# Patient Record
Sex: MALE | Race: WHITE | NOT HISPANIC OR LATINO | Employment: OTHER | URBAN - METROPOLITAN AREA
[De-identification: names, ages, dates, MRNs, and addresses within clinical notes are randomized per-mention and may not be internally consistent; named-entity substitution may affect disease eponyms.]

---

## 2019-07-12 ENCOUNTER — TRANSFERRED RECORDS (OUTPATIENT)
Dept: HEALTH INFORMATION MANAGEMENT | Facility: CLINIC | Age: 59
End: 2019-07-12

## 2021-02-24 ENCOUNTER — TRANSFERRED RECORDS (OUTPATIENT)
Dept: HEALTH INFORMATION MANAGEMENT | Facility: CLINIC | Age: 61
End: 2021-02-24

## 2023-06-01 ENCOUNTER — HEALTH MAINTENANCE LETTER (OUTPATIENT)
Age: 63
End: 2023-06-01

## 2023-06-12 ENCOUNTER — OFFICE VISIT (OUTPATIENT)
Dept: FAMILY MEDICINE | Facility: CLINIC | Age: 63
End: 2023-06-12
Payer: COMMERCIAL

## 2023-06-12 VITALS
HEART RATE: 66 BPM | DIASTOLIC BLOOD PRESSURE: 78 MMHG | TEMPERATURE: 97.7 F | BODY MASS INDEX: 30.94 KG/M2 | SYSTOLIC BLOOD PRESSURE: 118 MMHG | WEIGHT: 221 LBS | RESPIRATION RATE: 14 BRPM | HEIGHT: 71 IN | OXYGEN SATURATION: 99 %

## 2023-06-12 DIAGNOSIS — H81.10 BENIGN PAROXYSMAL POSITIONAL VERTIGO, UNSPECIFIED LATERALITY: Primary | ICD-10-CM

## 2023-06-12 PROCEDURE — 99203 OFFICE O/P NEW LOW 30 MIN: CPT | Performed by: FAMILY MEDICINE

## 2023-06-12 ASSESSMENT — PAIN SCALES - GENERAL: PAINLEVEL: NO PAIN (0)

## 2023-06-12 NOTE — PROGRESS NOTES
"  Assessment & Plan     Benign paroxysmal positional vertigo, unspecified laterality  Improved but mild residual vertigo noted.  Without other localizing symptoms noted.  Physical therapy referral is given.  Does have a short-term follow-up with the physical scheduled for 6/29/2023.  If persistent symptoms noted at that time could consider imaging evaluation.  Can use Dramamine type medication as needed if mild symptoms occur.  - Physical Therapy Referral; Future          Review records from [facility] everywhere    BMI:   Estimated body mass index is 30.6 kg/m  as calculated from the following:    Height as of this encounter: 1.81 m (5' 11.26\").    Weight as of this encounter: 100.2 kg (221 lb).   Weight management plan: Discussed healthy diet and exercise guidelines    Patient Instructions   Referral to PHYSICAL THERAPY for the vertigo symptoms.      Be sure to remain well hydrated.    Return to clinic for worsening headache or double vision, vomiting or other new symptoms.         Anabela Arriola MD  [facility] MIGUEL Del Valle is a 63 year old, presenting for the following health issues:  Dizziness        6/12/2023     8:27 AM   Additional Questions   Roomed by Crystal   Accompanied by self         6/12/2023     8:27 AM   Patient Reported Additional Medications   Patient reports taking the following new medications none     History of Present Illness       Reason for visit:  Lingering symptoms after Vertigo  Symptom onset:  More than a month    She eats 0-1 servings of fruits and vegetables daily.She consumes 1 sweetened beverage(s) daily.She exercises with enough effort to increase her heart rate 10 to 19 minutes per day.  She exercises with enough effort to increase her heart rate 3 or less days per week.   She is taking medications regularly.       Dizziness  Onset/Duration: 6 weeks ago  Description: Dizziness lightheaded mild headache  Do you feel faint: No  Does it feel like " "the surroundings (bed, room) are moving: YES- happened first couple of days  Unsteady/off balance: YES - not like going to fall but uncertain position in space.    Have you passed out or fallen: No  Intensity: mild, moderate  Progression of Symptoms: same  Accompanying Signs & Symptoms:  Heart palpitations or chest pain: No  Nausea, vomiting: No  Weakness or lack of coordination in arms or legs: No  Vision or speech changes: No  Numbness or tingling: No  Ringing in ears (Tinnitus): No  Hearing Loss: No  History:   Head trauma/concussion history: No  Previous similar symptoms: No  Recent bleeding history: No  Any new medications (BP?): No  Precipitating factors:   Worse with activity: YES when looking up  Worse with head movement: YES  Alleviating factors:   Does staying in a fixed position give relief: YES  Therapies tried and outcome: None        Sudden onset 6 week ago.  Laying on right side and flung over quick right to left and hit alarm - dizziness -vertigo.  No hearing deficit.  Felt like feet were having difficulty telling where he was  Spent a couple of days in bed.  Used dramamine   After 3 days symptoms were less and could go back to work.    headache initially - mild headache now more than normally.  No nausea.    Looking up and bending down worse symptoms noted, no symptoms with side to side moving head.     Hearing normal.    Review of Systems   Constitutional, HEENT, cardiovascular, pulmonary, gi and gu systems are negative, except as otherwise noted.      Objective    /78 (BP Location: Right arm, Patient Position: Sitting, Cuff Size: Adult Large)   Pulse 66   Temp 97.7  F (36.5  C) (Oral)   Resp 14   Ht 1.81 m (5' 11.26\")   Wt 100.2 kg (221 lb)   SpO2 99%   BMI 30.60 kg/m    Body mass index is 30.6 kg/m .  Physical Exam   GENERAL: alert, no distress and over weight  EYES: Eyes grossly normal to inspection, PERRL and conjunctivae and sclerae normal  HENT: ear canals and TM's normal, nose " and mouth without ulcers or lesions  NECK: no adenopathy, no asymmetry, masses, or scars and thyroid normal to palpation  RESP: lungs clear to auscultation - no rales, rhonchi or wheezes  CV: regular rate and rhythm, normal S1 S2, no S3 or S4, no murmur, click or rub, no peripheral edema and peripheral pulses strong  ABDOMEN: soft, nontender, no hepatosplenomegaly, no masses and bowel sounds normal  MS: no gross musculoskeletal defects noted, no edema  NEURO: Normal strength and tone, sensory exam grossly normal, mentation intact, speech normal, cranial nerves 2-12 intact without nystagmus, DTR's normal and symmetric throughout, gait abnormal: First step with slight antalgia to the left but able to complete the heel-to-toe following out without difficulty and Romberg able to maintain an upright position but felt as though he was falling backwards              This chart was documented by provider using a voice activated software called Dragon in addition to manual typing. There may be vocabulary errors or other grammatical errors due to this.

## 2023-06-12 NOTE — PATIENT INSTRUCTIONS
Referral to PHYSICAL THERAPY for the vertigo symptoms.      Be sure to remain well hydrated.    Return to clinic for worsening headache or double vision, vomiting or other new symptoms.

## 2023-06-29 ENCOUNTER — OFFICE VISIT (OUTPATIENT)
Dept: FAMILY MEDICINE | Facility: CLINIC | Age: 63
End: 2023-06-29
Payer: COMMERCIAL

## 2023-06-29 ENCOUNTER — TELEPHONE (OUTPATIENT)
Dept: FAMILY MEDICINE | Facility: CLINIC | Age: 63
End: 2023-06-29

## 2023-06-29 VITALS
OXYGEN SATURATION: 96 % | HEART RATE: 66 BPM | TEMPERATURE: 98.1 F | DIASTOLIC BLOOD PRESSURE: 81 MMHG | RESPIRATION RATE: 16 BRPM | BODY MASS INDEX: 30.63 KG/M2 | WEIGHT: 218.8 LBS | HEIGHT: 71 IN | SYSTOLIC BLOOD PRESSURE: 117 MMHG

## 2023-06-29 DIAGNOSIS — Z11.4 SCREENING FOR HIV (HUMAN IMMUNODEFICIENCY VIRUS): ICD-10-CM

## 2023-06-29 DIAGNOSIS — Z12.5 SCREENING FOR PROSTATE CANCER: ICD-10-CM

## 2023-06-29 DIAGNOSIS — Z00.00 ROUTINE GENERAL MEDICAL EXAMINATION AT A HEALTH CARE FACILITY: Primary | ICD-10-CM

## 2023-06-29 DIAGNOSIS — Z11.59 NEED FOR HEPATITIS C SCREENING TEST: ICD-10-CM

## 2023-06-29 DIAGNOSIS — Z13.1 SCREENING FOR DIABETES MELLITUS: ICD-10-CM

## 2023-06-29 DIAGNOSIS — Z13.220 LIPID SCREENING: ICD-10-CM

## 2023-06-29 DIAGNOSIS — R42 VERTIGO: ICD-10-CM

## 2023-06-29 LAB
ALBUMIN SERPL BCG-MCNC: 4.4 G/DL (ref 3.5–5.2)
ALP SERPL-CCNC: 56 U/L (ref 40–129)
ALT SERPL W P-5'-P-CCNC: 30 U/L (ref 0–70)
ANION GAP SERPL CALCULATED.3IONS-SCNC: 15 MMOL/L (ref 7–15)
AST SERPL W P-5'-P-CCNC: 27 U/L (ref 0–45)
BILIRUB SERPL-MCNC: 0.8 MG/DL
BUN SERPL-MCNC: 16.7 MG/DL (ref 8–23)
CALCIUM SERPL-MCNC: 9.5 MG/DL (ref 8.8–10.2)
CHLORIDE SERPL-SCNC: 102 MMOL/L (ref 98–107)
CHOLEST SERPL-MCNC: 184 MG/DL
CREAT SERPL-MCNC: 0.88 MG/DL (ref 0.67–1.17)
DEPRECATED HCO3 PLAS-SCNC: 24 MMOL/L (ref 22–29)
ERYTHROCYTE [DISTWIDTH] IN BLOOD BY AUTOMATED COUNT: 13.5 % (ref 10–15)
GFR SERPL CREATININE-BSD FRML MDRD: >90 ML/MIN/1.73M2
GLUCOSE SERPL-MCNC: 94 MG/DL (ref 70–99)
HCT VFR BLD AUTO: 45.3 % (ref 40–53)
HDLC SERPL-MCNC: 40 MG/DL
HGB BLD-MCNC: 15.4 G/DL (ref 13.3–17.7)
LDLC SERPL CALC-MCNC: 114 MG/DL
MCH RBC QN AUTO: 29.6 PG (ref 26.5–33)
MCHC RBC AUTO-ENTMCNC: 34 G/DL (ref 31.5–36.5)
MCV RBC AUTO: 87 FL (ref 78–100)
NONHDLC SERPL-MCNC: 144 MG/DL
PLATELET # BLD AUTO: 214 10E3/UL (ref 150–450)
POTASSIUM SERPL-SCNC: 4.4 MMOL/L (ref 3.4–5.3)
PROT SERPL-MCNC: 7.6 G/DL (ref 6.4–8.3)
PSA SERPL DL<=0.01 NG/ML-MCNC: 0.88 NG/ML (ref 0–4.5)
RBC # BLD AUTO: 5.2 10E6/UL (ref 4.4–5.9)
SODIUM SERPL-SCNC: 141 MMOL/L (ref 136–145)
TRIGL SERPL-MCNC: 149 MG/DL
TSH SERPL DL<=0.005 MIU/L-ACNC: 0.98 UIU/ML (ref 0.3–4.2)
WBC # BLD AUTO: 4.5 10E3/UL (ref 4–11)

## 2023-06-29 PROCEDURE — 85027 COMPLETE CBC AUTOMATED: CPT | Performed by: FAMILY MEDICINE

## 2023-06-29 PROCEDURE — 87389 HIV-1 AG W/HIV-1&-2 AB AG IA: CPT | Performed by: FAMILY MEDICINE

## 2023-06-29 PROCEDURE — 80053 COMPREHEN METABOLIC PANEL: CPT | Performed by: FAMILY MEDICINE

## 2023-06-29 PROCEDURE — 99396 PREV VISIT EST AGE 40-64: CPT | Performed by: FAMILY MEDICINE

## 2023-06-29 PROCEDURE — 80061 LIPID PANEL: CPT | Performed by: FAMILY MEDICINE

## 2023-06-29 PROCEDURE — 84443 ASSAY THYROID STIM HORMONE: CPT | Performed by: FAMILY MEDICINE

## 2023-06-29 PROCEDURE — 36415 COLL VENOUS BLD VENIPUNCTURE: CPT | Performed by: FAMILY MEDICINE

## 2023-06-29 PROCEDURE — 86803 HEPATITIS C AB TEST: CPT | Performed by: FAMILY MEDICINE

## 2023-06-29 PROCEDURE — G0103 PSA SCREENING: HCPCS | Performed by: FAMILY MEDICINE

## 2023-06-29 ASSESSMENT — ENCOUNTER SYMPTOMS
COUGH: 0
EYE PAIN: 0
HEADACHES: 1
ARTHRALGIAS: 1
HEMATURIA: 0
PARESTHESIAS: 0
PALPITATIONS: 0
NERVOUS/ANXIOUS: 0
CONSTIPATION: 0
JOINT SWELLING: 0
SHORTNESS OF BREATH: 0
SORE THROAT: 0
HEMATOCHEZIA: 0
ABDOMINAL PAIN: 0
FEVER: 0
HEARTBURN: 0
DIZZINESS: 1
MYALGIAS: 1
FREQUENCY: 1
BREAST MASS: 0
WEAKNESS: 0
DIARRHEA: 0
DYSURIA: 0
NAUSEA: 0
CHILLS: 0

## 2023-06-29 ASSESSMENT — PAIN SCALES - GENERAL: PAINLEVEL: NO PAIN (0)

## 2023-06-29 NOTE — PROGRESS NOTES
SUBJECTIVE:   CC: Eligio is an 63 year old who presents for preventative health visit.       6/29/2023     8:34 AM   Additional Questions   Roomed by Tata BROOKS   Accompanied by self     Healthy Habits:     Getting at least 3 servings of Calcium per day:  Yes    Bi-annual eye exam:  Yes    Dental care twice a year:  Yes    Sleep apnea or symptoms of sleep apnea:  None    Diet:  Regular (no restrictions)    Frequency of exercise:  1 day/week    Duration of exercise:  Less than 15 minutes    Taking medications regularly:  Yes    Medication side effects:  None    PHQ-2 Total Score: 0    Additional concerns today:  No    Biking, walking, weight lifting  -  No limitations.      3 DDD lumbar - careful with compression activities.      Vertigo:   Persistent vertigo symptoms.    Headache - since onset of vertigo - fuzzy feeling.  Not severe. Rolling in bed worse symptoms.  AM worse.      Today's PHQ-2 Score:       6/29/2023     8:31 AM   PHQ-2 ( 1999 [employer])   Q1: Little interest or pleasure in doing things 0   Q2: Feeling down, depressed or hopeless 0   PHQ-2 Score 0   Q1: Little interest or pleasure in doing things Not at all   Q2: Feeling down, depressed or hopeless Not at all   PHQ-2 Score 0           Social History     Tobacco Use     Smoking status: Never     Smokeless tobacco: Never   Substance Use Topics     Alcohol use: Not on file             6/29/2023     8:31 AM   Alcohol Use   Prescreen: >3 drinks/day or >7 drinks/week? No       Last PSA: No results found for: PSA    Reviewed orders with patient. Reviewed health maintenance and updated orders accordingly - Yes  BP Readings from Last 3 Encounters:   06/29/23 117/81   06/12/23 118/78    Wt Readings from Last 3 Encounters:   06/29/23 99.2 kg (218 lb 12.8 oz)   06/12/23 100.2 kg (221 lb)                    Reviewed and updated as needed this visit by clinical staff   Tobacco  Allergies  Meds   Med Hx  Surg Hx  Fam Hx          Reviewed and updated as needed  "this visit by Provider   Tobacco  Allergies  Meds   Med Hx  Surg Hx  Fam Hx         Past Medical History:   Diagnosis Date     BCC (basal cell carcinoma), face     nose, MOHS, other locations      Past Surgical History:   Procedure Laterality Date     ARTHROSCOPY KNEE Right     x 2     left rotator cuff surgery       SHOULDER SURGERY Right     bone spur       Review of Systems   Constitutional: Negative for chills and fever.   HENT: Negative for congestion, ear pain, hearing loss and sore throat.    Eyes: Negative for pain and visual disturbance.   Respiratory: Negative for cough and shortness of breath.    Cardiovascular: Negative for chest pain, palpitations and peripheral edema.   Gastrointestinal: Negative for abdominal pain, constipation, diarrhea, heartburn, hematochezia and nausea.   Breasts:  Negative for tenderness, breast mass and discharge.   Genitourinary: Positive for frequency. Negative for dysuria, genital sores, hematuria, pelvic pain, urgency, vaginal bleeding and vaginal discharge.   Musculoskeletal: Positive for arthralgias and myalgias. Negative for joint swelling.   Skin: Negative for rash.   Neurological: Positive for dizziness and headaches. Negative for weakness and paresthesias.   Psychiatric/Behavioral: Negative for mood changes. The patient is not nervous/anxious.          OBJECTIVE:   /81 (BP Location: Right arm, Patient Position: Sitting, Cuff Size: Adult Large)   Pulse 66   Temp 98.1  F (36.7  C) (Oral)   Resp 16   Ht 1.803 m (5' 11\")   Wt 99.2 kg (218 lb 12.8 oz)   SpO2 96%   BMI 30.52 kg/m      Physical Exam  GENERAL: alert, no distress and obese  EYES: Eyes grossly normal to inspection, PERRL and conjunctivae and sclerae normal  HENT: ear canals and TM's normal, nose and mouth without ulcers or lesions  NECK: no adenopathy, no asymmetry, masses, or scars and thyroid normal to palpation  RESP: lungs clear to auscultation - no rales, rhonchi or wheezes  CV: regular " rate and rhythm, normal S1 S2, no S3 or S4, no murmur, click or rub, no peripheral edema and peripheral pulses strong  ABDOMEN: soft, nontender, no hepatosplenomegaly, no masses and bowel sounds normal  MS: no gross musculoskeletal defects noted, no edema  SKIN: no suspicious lesions or rashes  NEURO: Normal strength and tone, mentation intact and speech normal.  CN2-12 intact.    PSYCH: mentation appears normal, affect normal/bright    Diagnostic Test Results:  Labs reviewed in Epic  Results for orders placed or performed in visit on 06/29/23   Lipid panel reflex to direct LDL Non-fasting     Status: Abnormal   Result Value Ref Range    Cholesterol 184 <200 mg/dL    Triglycerides 149 <150 mg/dL    Direct Measure HDL 40 >=40 mg/dL    LDL Cholesterol Calculated 114 (H) <=100 mg/dL    Non HDL Cholesterol 144 (H) <130 mg/dL    Narrative    Cholesterol  Desirable:  <200 mg/dL    Triglycerides  Normal:  Less than 150 mg/dL  Borderline High:  150-199 mg/dL  High:  200-499 mg/dL  Very High:  Greater than or equal to 500 mg/dL    Direct Measure HDL  Female:  Greater than or equal to 50 mg/dL   Male:  Greater than or equal to 40 mg/dL    LDL Cholesterol  Desirable:  <100mg/dL  Above Desirable:  100-129 mg/dL   Borderline High:  130-159 mg/dL   High:  160-189 mg/dL   Very High:  >= 190 mg/dL    Non HDL Cholesterol  Desirable:  130 mg/dL  Above Desirable:  130-159 mg/dL  Borderline High:  160-189 mg/dL  High:  190-219 mg/dL  Very High:  Greater than or equal to 220 mg/dL   PSA, screen     Status: Normal   Result Value Ref Range    Prostate Specific Antigen Screen 0.88 0.00 - 4.50 ng/mL    Narrative    This result is obtained using the Roche Elecsys total PSA method on the reyes e801 immunoassay analyzer. Results obtained with different assay methods or kits cannot be used interchangeably.   Comprehensive metabolic panel (BMP + Alb, Alk Phos, ALT, AST, Total. Bili, TP)     Status: Normal   Result Value Ref Range    Sodium 141  136 - 145 mmol/L    Potassium 4.4 3.4 - 5.3 mmol/L    Chloride 102 98 - 107 mmol/L    Carbon Dioxide (CO2) 24 22 - 29 mmol/L    Anion Gap 15 7 - 15 mmol/L    Urea Nitrogen 16.7 8.0 - 23.0 mg/dL    Creatinine 0.88 0.67 - 1.17 mg/dL    Calcium 9.5 8.8 - 10.2 mg/dL    Glucose 94 70 - 99 mg/dL    Alkaline Phosphatase 56 40 - 129 U/L    AST 27 0 - 45 U/L    ALT 30 0 - 70 U/L    Protein Total 7.6 6.4 - 8.3 g/dL    Albumin 4.4 3.5 - 5.2 g/dL    Bilirubin Total 0.8 <=1.2 mg/dL    GFR Estimate >90 >60 mL/min/1.73m2   CBC with platelets     Status: Normal   Result Value Ref Range    WBC Count 4.5 4.0 - 11.0 10e3/uL    RBC Count 5.20 4.40 - 5.90 10e6/uL    Hemoglobin 15.4 13.3 - 17.7 g/dL    Hematocrit 45.3 40.0 - 53.0 %    MCV 87 78 - 100 fL    MCH 29.6 26.5 - 33.0 pg    MCHC 34.0 31.5 - 36.5 g/dL    RDW 13.5 10.0 - 15.0 %    Platelet Count 214 150 - 450 10e3/uL   TSH with free T4 reflex     Status: Normal   Result Value Ref Range    TSH 0.98 0.30 - 4.20 uIU/mL       ASSESSMENT/PLAN:   (Z00.00) Routine general medical examination at a health care facility  (primary encounter diagnosis)  Comment: Nonfasting  Plan: Screening and preventive care discussed.  Reports he had colon cancer screening through [facility] physicians but he is uncertain of the date on that.  FABY is signed today to obtain those records    (R42) Vertigo  Comment: Persistent vertigo and nonspecific headache that have been present now for over 2 months.  Plan: Comprehensive metabolic panel (BMP + Alb, Alk         Phos, ALT, AST, Total. Bili, TP), CBC with         platelets, TSH with free T4 reflex, MR Brain         w/o & w Contrast        Information given regarding scheduling with the physical therapist for exercises for the vertigo.  Additional lab testing performed today is normal.  Order is given for an MRI for additional evaluation due to the prolonged nature of his symptoms.    (Z12.5) Screening for prostate cancer  Comment:   Plan: PSA, screen         Normal PSA    (Z13.220) Lipid screening  Comment: The 10-year ASCVD risk score (Javid RIDLEY, et al., 2019) is: 10.9%    Values used to calculate the score:      Age: 63 years      Sex: Male      Is Non- : No      Diabetic: No      Tobacco smoker: No      Systolic Blood Pressure: 117 mmHg      Is BP treated: No      HDL Cholesterol: 40 mg/dL      Total Cholesterol: 184 mg/dL  Plan: Lipid panel reflex to direct LDL Non-fasting        Due to elevated 10-year estimated risk, I have recommended cholesterol-lowering therapy.  Await patient response.  Could also consider cardiac imaging for coronary calcium score.    (Z13.1) Screening for diabetes mellitus  Comment:   Plan: Comprehensive metabolic panel (BMP + Alb, Alk         Phos, ALT, AST, Total. Bili, TP)        Normal blood sugar    (Z11.4) Screening for HIV (human immunodeficiency virus)  Comment: Low risk  Plan: HIV Antigen Antibody Combo        Screening    (Z11.59) Need for hepatitis C screening test  Comment: Low risk  Plan: Hepatitis C Screen Reflex to HCV RNA Quant and         Genotype        Screening      Patient has been advised of split billing requirements and indicates understanding: Yes      COUNSELING:   Reviewed preventive health counseling, as reflected in patient instructions       Regular exercise       Healthy diet/nutrition       Vision screening       Consider Hep C screening for all patients one time for ages 18-79 years       HIV screeninx in teen years, 1x in adult years, and at intervals if high risk       Colorectal cancer screening       Prostate cancer screening       Osteoporosis prevention/bone health        She reports that she has never smoked. She has never used smokeless tobacco.        Anabela Arriola MD  [facility]        Patient Instructions     Labs today.  Release of information for records from [facility].    MRI recommended as discussed due to persistent vertigo  symptoms.   You can call [phone] to schedule this at the [facility] building in [place]       Referral Details    Referred By  Referred To   Anabela Arriola MD   [address]   [facility] [address]   Phone: [phone]   Fax: [phone]    Diagnoses: Benign paroxysmal positional vertigo, unspecified laterality   Order: Physical Therapy Referral       Comment: Please be aware that coverage of these services is subject to the terms and limitations of your health insurance plan.  Call member services at your health plan with any benefit or coverage questions.   If you have not heard from the scheduling office within 2 business days, please call [phone] for [employer], [phone] for [employer] and [phone] for [employer].               This chart was documented by provider using a voice activated software called Dragon in addition to manual typing. There may be vocabulary errors or other grammatical errors due to this.

## 2023-06-29 NOTE — PATIENT INSTRUCTIONS
Labs today.  Release of information for records from [facility].    MRI recommended as discussed due to persistent vertigo symptoms.   You can call [phone] to schedule this at the [facility] building in [place]       Referral Details    Referred By  Referred To   Anabela Arriola MD   [address]   [facility] [address]   Phone: [phone]   Fax: [phone]    Diagnoses: Benign paroxysmal positional vertigo, unspecified laterality   Order: Physical Therapy Referral       Comment: Please be aware that coverage of these services is subject to the terms and limitations of your health insurance plan.  Call member services at your health plan with any benefit or coverage questions.   If you have not heard from the scheduling office within 2 business days, please call [phone] for [employer], [phone] for [employer] and [phone] for [employer].       Preventive Health Recommendations  Male Ages 50 - 64    Yearly exam:             See your health care provider every year in order to  o   Review health changes.   o   Discuss preventive care.    o   Review your medicines if your doctor has prescribed any.   Have a cholesterol test every 5 years, or more frequently if you are at risk for high cholesterol/heart disease.   Have a diabetes test (fasting glucose) every three years. If you are at risk for diabetes, you should have this test more often.   Have a colonoscopy at age 50, or have a yearly FIT test (stool test). These exams will check for colon cancer.    Talk with your health care provider about whether or not a prostate cancer screening test (PSA) is right for you.  You should be tested each year for STDs (sexually transmitted diseases), if you re at risk.     Shots: Get a flu shot each year. Get a tetanus shot every 10 years.     Nutrition:  Eat at least 5 servings of fruits and vegetables daily.   Eat whole-grain bread, whole-wheat pasta and brown rice instead of white grains  and rice.   Get adequate Calcium and Vitamin D.     Lifestyle  Exercise for at least 150 minutes a week (30 minutes a day, 5 days a week). This will help you control your weight and prevent disease.   Limit alcohol to one drink per day.   No smoking.   Wear sunscreen to prevent skin cancer.   See your dentist every six months for an exam and cleaning.   See your eye doctor every 1 to 2 years.

## 2023-06-30 ENCOUNTER — MYC MEDICAL ADVICE (OUTPATIENT)
Dept: FAMILY MEDICINE | Facility: CLINIC | Age: 63
End: 2023-06-30
Payer: COMMERCIAL

## 2023-06-30 DIAGNOSIS — E78.00 PURE HYPERCHOLESTEROLEMIA: Primary | ICD-10-CM

## 2023-06-30 LAB
HCV AB SERPL QL IA: NONREACTIVE
HIV 1+2 AB+HIV1 P24 AG SERPL QL IA: NONREACTIVE

## 2023-06-30 NOTE — RESULT ENCOUNTER NOTE
The 10-year ASCVD risk score (Javid RIDLEY, et al., 2019) is: 10.9%    Values used to calculate the score:      Age: 63 years      Sex: Male      Is Non- : No      Diabetic: No      Tobacco smoker: No      Systolic Blood Pressure: 117 mmHg      Is BP treated: No      HDL Cholesterol: 40 mg/dL      Total Cholesterol: 184 mg/dL   Your LDL cholesterol is elevated.  When considering these results along with other risk factors, your overall risk for heart disease is greater than 10% in the next 10 years.  When this risk is greater than 7.5% on our risk assessment analysis, the recommendation is to begin cholesterol-lowering medication to decrease risk for heart disease in the future.  Let me know your thoughts about taking a cholesterol-lowering medication.  PSA testing is negative for prostate cancer  Your blood sugar, kidney function, and liver testing are all normal.  Thyroid testing is normal.  Blood cell counts are normal.

## 2023-07-02 NOTE — RESULT ENCOUNTER NOTE
HIV and hepatitis C screening are both negative  Please call or MyChart message me if you have any questions.      PSK

## 2023-07-05 ENCOUNTER — ANCILLARY PROCEDURE (OUTPATIENT)
Dept: MRI IMAGING | Facility: CLINIC | Age: 63
End: 2023-07-05
Attending: FAMILY MEDICINE
Payer: COMMERCIAL

## 2023-07-05 DIAGNOSIS — R42 VERTIGO: ICD-10-CM

## 2023-07-05 PROCEDURE — 70553 MRI BRAIN STEM W/O & W/DYE: CPT | Mod: TC | Performed by: RADIOLOGY

## 2023-07-05 PROCEDURE — A9585 GADOBUTROL INJECTION: HCPCS | Mod: JW | Performed by: RADIOLOGY

## 2023-07-05 RX ORDER — GADOBUTROL 604.72 MG/ML
9.5 INJECTION INTRAVENOUS ONCE
Status: COMPLETED | OUTPATIENT
Start: 2023-07-05 | End: 2023-07-05

## 2023-07-05 RX ORDER — ATORVASTATIN CALCIUM 20 MG/1
20 TABLET, FILM COATED ORAL DAILY
Qty: 90 TABLET | Refills: 3 | Status: SHIPPED | OUTPATIENT
Start: 2023-07-05 | End: 2024-08-12

## 2023-07-05 RX ADMIN — GADOBUTROL 9.5 ML: 604.72 INJECTION INTRAVENOUS at 07:46

## 2024-07-01 ENCOUNTER — TELEPHONE (OUTPATIENT)
Dept: FAMILY MEDICINE | Facility: CLINIC | Age: 64
End: 2024-07-01
Payer: COMMERCIAL

## 2024-07-01 NOTE — TELEPHONE ENCOUNTER
Patient Quality Outreach    Patient is due for the following:   Physical Annual Wellness Visit      Topic Date Due    COVID-19 Vaccine (4 - 2023-24 season) 09/01/2023    Pneumococcal Vaccine (1 of 1 - PCV) 05/21/2024       Next Steps:   Schedule annual wellness visit     Type of outreach:    Sent [employer] message.    Next Steps:  Reach out within 90 days via MyChart and Letter.    Max number of attempts reached: No. Will try again in 90 days if patient still on fail list.    Questions for provider review:    None           Tata Cancino MA

## 2024-08-10 ENCOUNTER — HEALTH MAINTENANCE LETTER (OUTPATIENT)
Age: 64
End: 2024-08-10

## 2024-08-10 SDOH — HEALTH STABILITY: PHYSICAL HEALTH: ON AVERAGE, HOW MANY MINUTES DO YOU ENGAGE IN EXERCISE AT THIS LEVEL?: 50 MIN

## 2024-08-10 SDOH — HEALTH STABILITY: PHYSICAL HEALTH: ON AVERAGE, HOW MANY DAYS PER WEEK DO YOU ENGAGE IN MODERATE TO STRENUOUS EXERCISE (LIKE A BRISK WALK)?: 4 DAYS

## 2024-08-10 ASSESSMENT — SOCIAL DETERMINANTS OF HEALTH (SDOH): HOW OFTEN DO YOU GET TOGETHER WITH FRIENDS OR RELATIVES?: THREE TIMES A WEEK

## 2024-08-12 ENCOUNTER — OFFICE VISIT (OUTPATIENT)
Dept: FAMILY MEDICINE | Facility: CLINIC | Age: 64
End: 2024-08-12
Payer: COMMERCIAL

## 2024-08-12 VITALS
WEIGHT: 212.4 LBS | SYSTOLIC BLOOD PRESSURE: 114 MMHG | OXYGEN SATURATION: 97 % | RESPIRATION RATE: 10 BRPM | TEMPERATURE: 97.8 F | BODY MASS INDEX: 29.73 KG/M2 | DIASTOLIC BLOOD PRESSURE: 79 MMHG | HEART RATE: 68 BPM | HEIGHT: 71 IN

## 2024-08-12 DIAGNOSIS — N52.9 ERECTILE DYSFUNCTION, UNSPECIFIED ERECTILE DYSFUNCTION TYPE: ICD-10-CM

## 2024-08-12 DIAGNOSIS — Z00.00 ENCOUNTER FOR INITIAL ANNUAL WELLNESS VISIT (AWV) IN MEDICARE PATIENT: ICD-10-CM

## 2024-08-12 DIAGNOSIS — Z12.5 SCREENING FOR PROSTATE CANCER: ICD-10-CM

## 2024-08-12 DIAGNOSIS — Z13.220 LIPID SCREENING: Primary | ICD-10-CM

## 2024-08-12 LAB
ALBUMIN SERPL BCG-MCNC: 4.5 G/DL (ref 3.5–5.2)
ALP SERPL-CCNC: 64 U/L (ref 40–150)
ALT SERPL W P-5'-P-CCNC: 22 U/L (ref 0–70)
ANION GAP SERPL CALCULATED.3IONS-SCNC: 10 MMOL/L (ref 7–15)
AST SERPL W P-5'-P-CCNC: 24 U/L (ref 0–45)
BILIRUB SERPL-MCNC: 1 MG/DL
BUN SERPL-MCNC: 9.8 MG/DL (ref 8–23)
CALCIUM SERPL-MCNC: 9.5 MG/DL (ref 8.8–10.4)
CHLORIDE SERPL-SCNC: 103 MMOL/L (ref 98–107)
CHOLEST SERPL-MCNC: 217 MG/DL
CREAT SERPL-MCNC: 0.89 MG/DL (ref 0.67–1.17)
EGFRCR SERPLBLD CKD-EPI 2021: >90 ML/MIN/1.73M2
FASTING STATUS PATIENT QL REPORTED: NO
FASTING STATUS PATIENT QL REPORTED: NO
GLUCOSE SERPL-MCNC: 110 MG/DL (ref 70–99)
HCO3 SERPL-SCNC: 26 MMOL/L (ref 22–29)
HDLC SERPL-MCNC: 43 MG/DL
LDLC SERPL CALC-MCNC: 129 MG/DL
NONHDLC SERPL-MCNC: 174 MG/DL
POTASSIUM SERPL-SCNC: 4.5 MMOL/L (ref 3.4–5.3)
PROT SERPL-MCNC: 7.8 G/DL (ref 6.4–8.3)
PSA SERPL DL<=0.01 NG/ML-MCNC: 1.15 NG/ML (ref 0–4.5)
SODIUM SERPL-SCNC: 139 MMOL/L (ref 135–145)
TRIGL SERPL-MCNC: 224 MG/DL

## 2024-08-12 PROCEDURE — 80053 COMPREHEN METABOLIC PANEL: CPT | Performed by: INTERNAL MEDICINE

## 2024-08-12 PROCEDURE — 80061 LIPID PANEL: CPT | Performed by: INTERNAL MEDICINE

## 2024-08-12 PROCEDURE — 99213 OFFICE O/P EST LOW 20 MIN: CPT | Mod: 25 | Performed by: INTERNAL MEDICINE

## 2024-08-12 PROCEDURE — G0103 PSA SCREENING: HCPCS | Performed by: INTERNAL MEDICINE

## 2024-08-12 PROCEDURE — 36415 COLL VENOUS BLD VENIPUNCTURE: CPT | Performed by: INTERNAL MEDICINE

## 2024-08-12 PROCEDURE — 90677 PCV20 VACCINE IM: CPT | Performed by: INTERNAL MEDICINE

## 2024-08-12 PROCEDURE — G0009 ADMIN PNEUMOCOCCAL VACCINE: HCPCS | Performed by: INTERNAL MEDICINE

## 2024-08-12 PROCEDURE — G0402 INITIAL PREVENTIVE EXAM: HCPCS | Performed by: INTERNAL MEDICINE

## 2024-08-12 RX ORDER — TADALAFIL 10 MG/1
10 TABLET ORAL EVERY 24 HOURS
Qty: 30 TABLET | Refills: 0 | Status: SHIPPED | OUTPATIENT
Start: 2024-08-12

## 2024-08-12 ASSESSMENT — PAIN SCALES - GENERAL: PAINLEVEL: NO PAIN (0)

## 2024-08-12 NOTE — PROGRESS NOTES
Prior to immunization administration, verified patients identity using patient s name and date of birth. Please see Immunization Activity for additional information.     Screening Questionnaire for Adult Immunization    Are you sick today?   No   Do you have allergies to medications, food, a vaccine component or latex?   No   Have you ever had a serious reaction after receiving a vaccination?   No   Do you have a long-term health problem with heart, lung, kidney, or metabolic disease (e.g., diabetes), asthma, a blood disorder, no spleen, complement component deficiency, a cochlear implant, or a spinal fluid leak?  Are you on long-term aspirin therapy?   No   Do you have cancer, leukemia, HIV/AIDS, or any other immune system problem?   No   Do you have a parent, brother, or sister with an immune system problem?   No   In the past 3 months, have you taken medications that affect  your immune system, such as prednisone, other steroids, or anticancer drugs; drugs for the treatment of rheumatoid arthritis, Crohn s disease, or psoriasis; or have you had radiation treatments?   No   Have you had a seizure, or a brain or other nervous system problem?   No   During the past year, have you received a transfusion of blood or blood    products, or been given immune (gamma) globulin or antiviral drug?   No   For women: Are you pregnant or is there a chance you could become       pregnant during the next month?   No   Have you received any vaccinations in the past 4 weeks?   No     Immunization questionnaire answers were all negative.      Patient instructed to remain in clinic for 15 minutes afterwards, and to report any adverse reactions.     Screening performed by Nury Rendon MA on 8/12/2024 at 11:48 AM.

## 2024-08-12 NOTE — PROGRESS NOTES
Preventive Care Visit  [facility] MIGUEL FELDMAN  Jarrell Dash MD, Internal Medicine  Aug 12, 2024      Assessment & Plan     Encounter for initial annual wellness visit (AWV) in Medicare patient  Up-to-date with colonoscopy  He does have some LUTS symptoms  We discussed about saw palmetto  Had a history of hyperlipidemia with ASCVD score more than 10 in the past  Statin was tried for him  They tried Lipitor however he did not tolerate it because of body aches/joint pains  We discussed about other options like Metamucil/taking a statin along with co-Q10  We did try red yeast rice which also caused him to have body aches/joint pains  He is up-to-date with all the vaccines except RSV and pneumococcal  He is going to get pneumococcal today  - Comprehensive metabolic panel (BMP + Alb, Alk Phos, ALT, AST, Total. Bili, TP); Future  - Comprehensive metabolic panel (BMP + Alb, Alk Phos, ALT, AST, Total. Bili, TP)    Lipid screening  He did not tolerate statin  The 10-year ASCVD risk score (Javid RIDLEY, et al., 2019) is: 11.3%    Values used to calculate the score:      Age: 64 years      Sex: Male      Is Non- : No      Diabetic: No      Tobacco smoker: No      Systolic Blood Pressure: 114 mmHg      Is BP treated: No      HDL Cholesterol: 40 mg/dL      Total Cholesterol: 184 mg/dL   No other risk factors for ASCVD except hyperlipidemia and age  We discussed about CT calcium score  He is going to look into it and if it is elevated then we have to consider starting statin again with co-Q10 but this time he can try something I can pravastatin  - Lipid panel reflex to direct LDL Non-fasting; Future  - Lipid panel reflex to direct LDL Non-fasting    Screening for prostate cancer  He does have leg symptoms so we discussed about saw palmetto  - PSA, screen; Future  - PSA, screen    Erectile dysfunction, unspecified erectile dysfunction type    - tadalafil (CIALIS) 10 MG tablet; Take 1 tablet (10  "mg) by mouth every 24 hours 10 mg as a single dose =30 minutes prior to anticipated sexual activity; do not take more than once daily. Erectile function may be improved for up to 36 hours following a single dose    Patient has been advised of split billing requirements and indicates understanding: No        BMI  Estimated body mass index is 29.42 kg/m  as calculated from the following:    Height as of this encounter: 1.81 m (5' 11.25\").    Weight as of this encounter: 96.3 kg (212 lb 6.4 oz).   Weight management plan: Discussed healthy diet and exercise guidelines    Counseling  Appropriate preventive services were addressed with this patient via screening, questionnaire, or discussion as appropriate for fall prevention, nutrition, physical activity, Tobacco-use cessation, weight loss and cognition.  Checklist reviewing preventive services available has been given to the patient.  Reviewed patient's diet, addressing concerns and/or questions.   The patient was provided with written information regarding signs of hearing loss.   Information on urinary incontinence and treatment options given to patient.           Kaylan Arrington is a 64 year old, presenting for the following:  Medicare Visit (Welcome to Medicare. Discuss atorvastatin Rx-had negative side effects and stopped taking, would like to discuss)        8/12/2024    10:57 AM   Additional Questions   Roomed by Nury KIRKLAND   Accompanied by Self         8/12/2024    10:57 AM   Patient Reported Additional Medications   Patient reports taking the following new medications None        Health Care Directive  Patient does not have a Health Care Directive or Living Will: Discussed advance care planning with patient; however, patient declined at this time.    HPI  Here for initial Medicare annual wellness visit            8/10/2024   General Health   How would you rate your overall physical health? Good   Feel stress (tense, anxious, or unable to sleep) Not at all    "         8/10/2024   Nutrition   Diet: Regular (no restrictions)            8/10/2024   Exercise   Days per week of moderate/strenous exercise 4 days   Average minutes spent exercising at this level 50 min            8/10/2024   Social Factors   Frequency of gathering with friends or relatives Three times a week   Worry food won't last until get money to buy more No   Food not last or not have enough money for food? No   Do you have housing? (Housing is defined as stable permanent housing and does not include staying ouside in a car, in a tent, in an abandoned building, in an overnight shelter, or couch-surfing.) Yes   Are you worried about losing your housing? No   Lack of transportation? No   Unable to get utilities (heat,electricity)? No            8/10/2024   Fall Risk   Fallen 2 or more times in the past year? No   Trouble with walking or balance? No             8/10/2024   Activities of Daily Living- Home Safety   Needs help with the following daily activites None of the above   Safety concerns in the home None of the above            8/10/2024   Dental   Dentist two times every year? Yes            8/10/2024   Hearing Screening   Hearing concerns? (!) IT'S HARD TO FOLLOW A CONVERSATION IN A NOISY RESTAURANT OR CROWDED ROOM.            8/10/2024   Driving Risk Screening   Patient/family members have concerns about driving No            8/10/2024   General Alertness/Fatigue Screening   Have you been more tired than usual lately? No            8/10/2024   Urinary Incontinence Screening   Bothered by leaking urine in past 6 months Yes            8/10/2024   TB Screening   Were you born outside of the [place]? No            Today's PHQ-2 Score:       8/11/2024    10:10 PM   PHQ-2 ( 1999 [employer])   Q1: Little interest or pleasure in doing things 0   Q2: Feeling down, depressed or hopeless 0   PHQ-2 Score 0   Q1: Little interest or pleasure in doing things Not at all   Q2: Feeling down, depressed or hopeless Not at all    PHQ-2 Score 0           8/10/2024   Substance Use   Alcohol more than 3/day or more than 7/wk No   Do you have a current opioid prescription? No   How severe/bad is pain from 1 to 10? 3/10   Do you use any other substances recreationally? No        Social History     Tobacco Use    Smoking status: Never    Smokeless tobacco: Never   Vaping Use    Vaping status: Never Used   Substance Use Topics    Alcohol use: Yes    Drug use: Never           8/10/2024   AAA Screening   Family history of Abdominal Aortic Aneurysm (AAA)? No      Last PSA:   Prostate Specific Antigen Screen   Date Value Ref Range Status   06/29/2023 0.88 0.00 - 4.50 ng/mL Final     ASCVD Risk   The 10-year ASCVD risk score (Javid DK, et al., 2019) is: 11.3%    Values used to calculate the score:      Age: 64 years      Sex: Male      Is Non- : No      Diabetic: No      Tobacco smoker: No      Systolic Blood Pressure: 114 mmHg      Is BP treated: No      HDL Cholesterol: 40 mg/dL      Total Cholesterol: 184 mg/dL            Reviewed and updated as needed this visit by Provider                      Current providers sharing in care for this patient include:  Patient Care Team:  No Ref-Primary, Physician as PCP - Anabela Driver MD as Assigned PCP    The following health maintenance items are reviewed in Epic and correct as of today:  Health Maintenance   Topic Date Due    ADVANCE CARE PLANNING  Never done    RSV VACCINE (Pregnancy & 60+) (1 - 1-dose 60+ series) Never done    COVID-19 Vaccine (4 - 2023-24 season) 09/01/2023    MEDICARE ANNUAL WELLNESS VISIT  06/29/2024    LIPID  06/29/2024    ANNUAL REVIEW OF HM ORDERS  06/29/2024    Pneumococcal Vaccine: 65+ Years (1 of 1 - PCV) 05/21/2024    INFLUENZA VACCINE (1) 09/01/2024    FALL RISK ASSESSMENT  08/12/2025    GLUCOSE  06/29/2026    DTAP/TDAP/TD IMMUNIZATION (4 - Td or Tdap) 07/20/2026    COLORECTAL CANCER SCREENING  07/12/2029    HEPATITIS C SCREENING   "Completed    HIV SCREENING  Completed    PHQ-2 (once per calendar year)  Completed    ZOSTER IMMUNIZATION  Completed    IPV IMMUNIZATION  Aged Out    HPV IMMUNIZATION  Aged Out    MENINGITIS IMMUNIZATION  Aged Out    RSV MONOCLONAL ANTIBODY  Aged Out         Review of Systems  Constitutional, neuro, ENT, endocrine, pulmonary, cardiac, gastrointestinal, genitourinary, musculoskeletal, integument and psychiatric systems are negative, except as otherwise noted.     Objective    Exam  /79 (BP Location: Right arm, Patient Position: Sitting, Cuff Size: Adult Large)   Pulse 68   Temp 97.8  F (36.6  C) (Tympanic)   Resp 10   Ht 1.81 m (5' 11.25\")   Wt 96.3 kg (212 lb 6.4 oz)   SpO2 97%   BMI 29.42 kg/m     Estimated body mass index is 29.42 kg/m  as calculated from the following:    Height as of this encounter: 1.81 m (5' 11.25\").    Weight as of this encounter: 96.3 kg (212 lb 6.4 oz).    Physical Exam  GENERAL: alert and no distress  EYES: Eyes grossly normal to inspection, PERRL and conjunctivae and sclerae normal  HENT: ear canals and TM's normal, nose and mouth without ulcers or lesions  NECK: no adenopathy, no asymmetry, masses, or scars  RESP: lungs clear to auscultation - no rales, rhonchi or wheezes  CV: regular rate and rhythm, normal S1 S2, no S3 or S4, no murmur, click or rub, no peripheral edema  ABDOMEN: soft, nontender, no hepatosplenomegaly, no masses and bowel sounds normal  MS: no gross musculoskeletal defects noted, no edema  SKIN: no suspicious lesions or rashes  NEURO: Normal strength and tone, mentation intact and speech normal  PSYCH: mentation appears normal, affect normal/bright         8/12/2024   Mini Cog   Clock Draw Score 2 Normal   3 Item Recall 2 objects recalled   Mini Cog Total Score 4            Vision Screen  Patient wears corrective lenses (select all that apply): Worn during vision screen;Wears regularly  Vision Screen Results: Pass      Signed Electronically by: Jarrell " MD Ekta

## 2024-09-26 ENCOUNTER — TELEPHONE (OUTPATIENT)
Dept: FAMILY MEDICINE | Facility: CLINIC | Age: 64
End: 2024-09-26
Payer: COMMERCIAL

## 2024-09-26 NOTE — TELEPHONE ENCOUNTER
I can certainly see him  Please feel free to use my same-day slot either at [place] or [place]

## 2024-09-26 NOTE — TELEPHONE ENCOUNTER
Patient stated that he was seen with urgent care over the week for weakness and numbness in arms and legs. Patient stated that the  provider stated for him to see his primary for a follow up and to get tested to see what is causing the weakness and numbness , Patient was trying to get seen before your next availability which was October 23rd  could patient get seen in one of the providers same day slots if so please give patient a call to set the appointment up asap. Please advise. Thank you.       Cherise Whelan on 9/26/2024 at 12:12 PM

## 2024-09-27 ENCOUNTER — OFFICE VISIT (OUTPATIENT)
Dept: FAMILY MEDICINE | Facility: CLINIC | Age: 64
End: 2024-09-27
Payer: COMMERCIAL

## 2024-09-27 VITALS
RESPIRATION RATE: 18 BRPM | WEIGHT: 213 LBS | BODY MASS INDEX: 29.5 KG/M2 | HEART RATE: 57 BPM | DIASTOLIC BLOOD PRESSURE: 72 MMHG | OXYGEN SATURATION: 99 % | SYSTOLIC BLOOD PRESSURE: 120 MMHG | TEMPERATURE: 97.5 F

## 2024-09-27 DIAGNOSIS — E66.89 OTHER OBESITY: ICD-10-CM

## 2024-09-27 DIAGNOSIS — R20.9 ALTERATIONS OF SENSATIONS: ICD-10-CM

## 2024-09-27 DIAGNOSIS — M62.81 GENERALIZED MUSCLE WEAKNESS: Primary | ICD-10-CM

## 2024-09-27 DIAGNOSIS — R25.3 MUSCLE TWITCHING: ICD-10-CM

## 2024-09-27 LAB
B BURGDOR IGG+IGM SER QL: 0.2
BASOPHILS # BLD AUTO: 0 10E3/UL (ref 0–0.2)
BASOPHILS NFR BLD AUTO: 1 %
EOSINOPHIL # BLD AUTO: 0.1 10E3/UL (ref 0–0.7)
EOSINOPHIL NFR BLD AUTO: 1 %
ERYTHROCYTE [DISTWIDTH] IN BLOOD BY AUTOMATED COUNT: 13 % (ref 10–15)
ERYTHROCYTE [SEDIMENTATION RATE] IN BLOOD BY WESTERGREN METHOD: 7 MM/HR (ref 0–20)
HCT VFR BLD AUTO: 46 % (ref 40–53)
HGB BLD-MCNC: 15.5 G/DL (ref 13.3–17.7)
IMM GRANULOCYTES # BLD: 0 10E3/UL
IMM GRANULOCYTES NFR BLD: 0 %
LYMPHOCYTES # BLD AUTO: 1.1 10E3/UL (ref 0.8–5.3)
LYMPHOCYTES NFR BLD AUTO: 23 %
MCH RBC QN AUTO: 29.5 PG (ref 26.5–33)
MCHC RBC AUTO-ENTMCNC: 33.7 G/DL (ref 31.5–36.5)
MCV RBC AUTO: 88 FL (ref 78–100)
MONOCYTES # BLD AUTO: 0.5 10E3/UL (ref 0–1.3)
MONOCYTES NFR BLD AUTO: 11 %
NEUTROPHILS # BLD AUTO: 3 10E3/UL (ref 1.6–8.3)
NEUTROPHILS NFR BLD AUTO: 65 %
PLATELET # BLD AUTO: 213 10E3/UL (ref 150–450)
RBC # BLD AUTO: 5.26 10E6/UL (ref 4.4–5.9)
WBC # BLD AUTO: 4.7 10E3/UL (ref 4–11)

## 2024-09-27 PROCEDURE — 86039 ANTINUCLEAR ANTIBODIES (ANA): CPT | Performed by: INTERNAL MEDICINE

## 2024-09-27 PROCEDURE — 82550 ASSAY OF CK (CPK): CPT | Performed by: INTERNAL MEDICINE

## 2024-09-27 PROCEDURE — 85652 RBC SED RATE AUTOMATED: CPT | Performed by: INTERNAL MEDICINE

## 2024-09-27 PROCEDURE — 99000 SPECIMEN HANDLING OFFICE-LAB: CPT | Performed by: INTERNAL MEDICINE

## 2024-09-27 PROCEDURE — 82248 BILIRUBIN DIRECT: CPT | Performed by: INTERNAL MEDICINE

## 2024-09-27 PROCEDURE — 85025 COMPLETE CBC W/AUTO DIFF WBC: CPT | Performed by: INTERNAL MEDICINE

## 2024-09-27 PROCEDURE — 82607 VITAMIN B-12: CPT | Performed by: INTERNAL MEDICINE

## 2024-09-27 PROCEDURE — 83516 IMMUNOASSAY NONANTIBODY: CPT | Mod: 90 | Performed by: INTERNAL MEDICINE

## 2024-09-27 PROCEDURE — 83519 RIA NONANTIBODY: CPT | Mod: 90 | Performed by: INTERNAL MEDICINE

## 2024-09-27 PROCEDURE — 86038 ANTINUCLEAR ANTIBODIES: CPT | Performed by: INTERNAL MEDICINE

## 2024-09-27 PROCEDURE — 84100 ASSAY OF PHOSPHORUS: CPT | Performed by: INTERNAL MEDICINE

## 2024-09-27 PROCEDURE — 84165 PROTEIN E-PHORESIS SERUM: CPT | Performed by: PATHOLOGY

## 2024-09-27 PROCEDURE — 80053 COMPREHEN METABOLIC PANEL: CPT | Performed by: INTERNAL MEDICINE

## 2024-09-27 PROCEDURE — 84443 ASSAY THYROID STIM HORMONE: CPT | Performed by: INTERNAL MEDICINE

## 2024-09-27 PROCEDURE — 86618 LYME DISEASE ANTIBODY: CPT | Performed by: INTERNAL MEDICINE

## 2024-09-27 PROCEDURE — 82306 VITAMIN D 25 HYDROXY: CPT | Performed by: INTERNAL MEDICINE

## 2024-09-27 PROCEDURE — 84166 PROTEIN E-PHORESIS/URINE/CSF: CPT | Performed by: PATHOLOGY

## 2024-09-27 PROCEDURE — 86431 RHEUMATOID FACTOR QUANT: CPT | Performed by: INTERNAL MEDICINE

## 2024-09-27 PROCEDURE — 36415 COLL VENOUS BLD VENIPUNCTURE: CPT | Performed by: INTERNAL MEDICINE

## 2024-09-27 PROCEDURE — 86255 FLUORESCENT ANTIBODY SCREEN: CPT | Mod: 90 | Performed by: INTERNAL MEDICINE

## 2024-09-27 PROCEDURE — 82085 ASSAY OF ALDOLASE: CPT | Mod: 90 | Performed by: INTERNAL MEDICINE

## 2024-09-27 PROCEDURE — 99214 OFFICE O/P EST MOD 30 MIN: CPT | Performed by: INTERNAL MEDICINE

## 2024-09-27 ASSESSMENT — PAIN SCALES - GENERAL: PAINLEVEL: NO PAIN (0)

## 2024-09-27 NOTE — PATIENT INSTRUCTIONS
At [facility], we strive to deliver an exceptional experience to you, every time we see you. If you receive a survey, please let us know what we are doing well and/or what we could improve upon, as we do value your feedback.  If you have MyChart, you can expect to receive results automatically within 24 hours of their completion.  Your provider will send a note interpreting your results as well.   If you do not have MyChart, you should receive your results in about a week by mail.    Your care team:                            Family Medicine Internal Medicine   MD Jose Martin Siddiqi, MD Rachna Hernandez, MD Jarrell Dash, MD Sneha Maza, PAMiyaC    Jatin Crenshaw, MD Pediatrics   Ruthann Mckeon, MD Mayuri Leavitt, MD Renee Kovacs, APRN CNP Rehana Winston APRN CNP   MD Lou Rendon, MD Cindy Rosen, CNP     Jules Mcnamara, CNP Same-Day Provider (No follow-up visits)   KIRSTEN Arana, DNP Flaquita Alston, KIRSTEN Perez, FNP, BC HAYDEN LamasC     Clinic hours: Monday - Thursday 7 am-6 pm; Fridays 7 am-5 pm.   Urgent care: Monday - Friday 10 am- 8 pm; Saturday and Sunday 9 am-5 pm.    Clinic: [phone]       [facility]: Monday - Thursday 8 am - 7 pm; Friday 8 am - 6 pm  [facility]: [phone]

## 2024-09-27 NOTE — PROGRESS NOTES
Assessment & Plan     Muscle twitching  Presented with symptoms of lower extremity predominantly proximal muscle weakness and also upper extremity muscle weakness  He also noticed some fasciculations in the upper extremity  I have started workup for ALS/GBS/myopathies/Guillain-Barré syndrome  - CBC with platelets and differential; Future  - Basic metabolic panel  (Ca, Cl, CO2, Creat, Gluc, K, Na, BUN); Future  - Phosphorus; Future  - Hepatic panel (Albumin, ALT, AST, Bili, Alk Phos, TP); Future  - CK total; Future  - TSH with free T4 reflex; Future  - Rheumatoid factor; Future  - Anti Nuclear Luan IgG by IFA with Reflex; Future  - Aldolase; Future  - LYME DISEASE TOTAL ANTIBODIES WITH REFLEX TO CONFIRMATION; Future  - GM1 antibody panel; Future  - Protein electrophoresis; Future  - CBC with platelets and differential; Future  - Protein electrophoresis random urine; Future  - ACETYLCHOLINE RECEPTOR BINDING; Future  - STRIATED MUSCLE ANTIBODY IGG; Future  - ACETYLCHOLINE MODULATING ANTIBODY; Future  - ACETYLCHOLINE RECEPTOR BLOCKING LUAN; Future  - ESR: Erythrocyte sedimentation rate; Future  - CBC with platelets and differential  - Basic metabolic panel  (Ca, Cl, CO2, Creat, Gluc, K, Na, BUN)  - Phosphorus  - Hepatic panel (Albumin, ALT, AST, Bili, Alk Phos, TP)  - CK total  - TSH with free T4 reflex  - Rheumatoid factor  - Anti Nuclear Luan IgG by IFA with Reflex  - Aldolase  - LYME DISEASE TOTAL ANTIBODIES WITH REFLEX TO CONFIRMATION  - GM1 antibody panel  - Protein electrophoresis  - Protein electrophoresis random urine  - ACETYLCHOLINE RECEPTOR BINDING  - STRIATED MUSCLE ANTIBODY IGG  - ACETYLCHOLINE MODULATING ANTIBODY  - ACETYLCHOLINE RECEPTOR BLOCKING LUAN  - ESR: Erythrocyte sedimentation rate    Generalized muscle weakness  As stated above he was in his normal health up until approximately a month ago  He actually saw me for a physical in August  At that time he was perfectly normal  Then he started noticing  "some lower extremity muscle weakness  Weakness was predominantly in the proximal muscles of the legs  He noticed that the legs feels \"heavy\" and he is unable to move them briefly for 10 to 15 seconds and after that he can move them  He also noticed that he has some sensory symptoms like static electricity at times on the legs  He noticed that he was becoming weak  However there was no history of the weakness getting worse by the end of the day or with repeated movements  He also noticed that when he was playing golf swing was not as powerful  He thinks him there might be some wasting  Differential diagnosis is wide and includes ALS versus GBS versus myasthenia versus myopathies  We also help rule out common stuff like hypothyroidism/electrolyte deficiencies  I have ordered the relevant lab work  Explained that if the lab work is all normal and symptoms are progressing then we need to get a neurology evaluation  When I saw him in August he received a pneumococcal vaccination but when I looked at the literature there is no reports of neurological problems like this with  the vaccine although  it is known to cause muscle weakness  - CBC with platelets and differential; Future  - Basic metabolic panel  (Ca, Cl, CO2, Creat, Gluc, K, Na, BUN); Future  - Phosphorus; Future  - Hepatic panel (Albumin, ALT, AST, Bili, Alk Phos, TP); Future  - CK total; Future  - TSH with free T4 reflex; Future  - Rheumatoid factor; Future  - Anti Nuclear Luan IgG by IFA with Reflex; Future  - Aldolase; Future  - GM1 antibody panel; Future  - Protein electrophoresis; Future  - CBC with platelets and differential; Future  - Protein electrophoresis random urine; Future  - ACETYLCHOLINE RECEPTOR BINDING; Future  - STRIATED MUSCLE ANTIBODY IGG; Future  - ACETYLCHOLINE MODULATING ANTIBODY; Future  - ACETYLCHOLINE RECEPTOR BLOCKING LUAN; Future  - ESR: Erythrocyte sedimentation rate; Future  - CBC with platelets and differential  - Basic metabolic " panel  (Ca, Cl, CO2, Creat, Gluc, K, Na, BUN)  - Phosphorus  - Hepatic panel (Albumin, ALT, AST, Bili, Alk Phos, TP)  - CK total  - TSH with free T4 reflex  - Rheumatoid factor  - Anti Nuclear Luan IgG by IFA with Reflex  - Aldolase  - GM1 antibody panel  - Protein electrophoresis  - Protein electrophoresis random urine  - ACETYLCHOLINE RECEPTOR BINDING  - STRIATED MUSCLE ANTIBODY IGG  - ACETYLCHOLINE MODULATING ANTIBODY  - ACETYLCHOLINE RECEPTOR BLOCKING LUAN  - ESR: Erythrocyte sedimentation rate    Alterations of sensations  He has very minimal sensory symptoms like static electric symptoms in the lower extremities  Today on examination his reflexes are brisk so GBS is unlikely  - Vitamin B12; Future  - ESR: Erythrocyte sedimentation rate; Future  - Vitamin B12  - ESR: Erythrocyte sedimentation rate    Other obesity  We also need to check vitamin D  - Vitamin D deficiency screening; Future  - Vitamin D deficiency screening        MED REC REQUIRED  Post Medication Reconciliation Status: discharge medications reconciled, continue medications without change        Subjective   Murphy is a 64 year old, presenting for the following health issues:  Hospital F/U (Weakness of legs and arms feels like legs doesn't want to walk right away)        9/27/2024    12:56 PM   Additional Questions   Roomed by Marisa   Accompanied by self         9/27/2024    12:56 PM   Patient Reported Additional Medications   Patient reports taking the following new medications none     HPI   Here to discuss about his muscle weakness symptoms    ED/UC Followup:    Facility:  [facility]  Date of visit: 09/26/24  Reason for visit: Weakness in legs and arms  Current Status: Still not the best        Review of Systems  Constitutional, HEENT, cardiovascular, pulmonary, gi and gu systems are negative, except as otherwise noted.      Objective    /72 (BP Location: Left arm, Patient Position: Sitting, Cuff Size: Adult Large)   Pulse  57   Temp 97.5  F (36.4  C) (Oral)   Resp 18   Wt 96.6 kg (213 lb)   SpO2 99%   BMI 29.50 kg/m    Body mass index is 29.5 kg/m .  Physical Exam   GENERAL: alert and no distress  EYES: Eyes grossly normal to inspection, PERRL and conjunctivae and sclerae normal  NECK: no adenopathy, no asymmetry, masses, or scars  RESP: lungs clear to auscultation - no rales, rhonchi or wheezes  CV: regular rate and rhythm, normal S1 S2, no S3 or S4, no murmur, click or rub, no peripheral edema  ABDOMEN: soft, nontender, no hepatosplenomegaly, no masses and bowel sounds normal  MS: There is certainly some decreased power of around 4 out of 5 in both lower extremities particularly in the proximal muscles  There is certainly some weakness in his hip flexors and extensors  I could not elicit any fasciculations  I did not see any wasting  Upper extremity shoulder girdl muscle strength is normal  NEURO: sensory exam grossly normal, mentation intact, and DTR's normal and symmetric             Signed Electronically by: Jarrell Dash MD

## 2024-09-28 LAB
ALBUMIN SERPL BCG-MCNC: 4.5 G/DL (ref 3.5–5.2)
ALP SERPL-CCNC: 61 U/L (ref 40–150)
ALT SERPL W P-5'-P-CCNC: 24 U/L (ref 0–70)
ANION GAP SERPL CALCULATED.3IONS-SCNC: 11 MMOL/L (ref 7–15)
AST SERPL W P-5'-P-CCNC: 25 U/L (ref 0–45)
BILIRUB DIRECT SERPL-MCNC: 0.25 MG/DL (ref 0–0.3)
BILIRUB SERPL-MCNC: 1.3 MG/DL
BUN SERPL-MCNC: 14.3 MG/DL (ref 8–23)
CALCIUM SERPL-MCNC: 9.3 MG/DL (ref 8.8–10.4)
CHLORIDE SERPL-SCNC: 104 MMOL/L (ref 98–107)
CK SERPL-CCNC: 79 U/L (ref 39–308)
CREAT SERPL-MCNC: 0.89 MG/DL (ref 0.67–1.17)
EGFRCR SERPLBLD CKD-EPI 2021: >90 ML/MIN/1.73M2
GLUCOSE SERPL-MCNC: 93 MG/DL (ref 70–99)
HCO3 SERPL-SCNC: 24 MMOL/L (ref 22–29)
PHOSPHATE SERPL-MCNC: 3.5 MG/DL (ref 2.5–4.5)
POTASSIUM SERPL-SCNC: 4.1 MMOL/L (ref 3.4–5.3)
PROT SERPL-MCNC: 7.7 G/DL (ref 6.4–8.3)
RHEUMATOID FACT SERPL-ACNC: <10 IU/ML
SODIUM SERPL-SCNC: 139 MMOL/L (ref 135–145)
TOTAL PROTEIN SERUM FOR ELP: 7.4 G/DL (ref 6.4–8.3)
TSH SERPL DL<=0.005 MIU/L-ACNC: 1.05 UIU/ML (ref 0.3–4.2)
VIT B12 SERPL-MCNC: 530 PG/ML (ref 232–1245)
VIT D+METAB SERPL-MCNC: 46 NG/ML (ref 20–50)

## 2024-09-29 LAB
ACHR BIND AB SER-SCNC: 0 NMOL/L
ACHR MOD AB/ACHR TOTAL SFR SER: 0 %
ALDOLASE SERPL-CCNC: 3.1 U/L
STRIA MUS IGG SER QL IF: NORMAL

## 2024-09-30 LAB
ACHR BLOCK AB/ACHR TOTAL SFR SER: 19 %
ANA PAT SER IF-IMP: ABNORMAL
ANA SER QL IF: ABNORMAL
ANA TITR SER IF: ABNORMAL {TITER}

## 2024-10-01 LAB
ALBUMIN SERPL ELPH-MCNC: 4.4 G/DL (ref 3.7–5.1)
ALPHA1 GLOB SERPL ELPH-MCNC: 0.2 G/DL (ref 0.2–0.4)
ALPHA2 GLOB SERPL ELPH-MCNC: 0.5 G/DL (ref 0.5–0.9)
B-GLOBULIN SERPL ELPH-MCNC: 0.9 G/DL (ref 0.6–1)
GAMMA GLOB SERPL ELPH-MCNC: 1.3 G/DL (ref 0.7–1.6)
GM1 GANGL IGG SER IA-ACNC: 4 IV
GM1 GANGL IGM SER IA-ACNC: 9 IV
LOCATION OF TASK: NORMAL
LOCATION OF TASK: NORMAL
M PROTEIN SERPL ELPH-MCNC: 0 G/DL
PROT PATTERN SERPL ELPH-IMP: NORMAL
PROT PATTERN UR ELPH-IMP: NORMAL

## 2024-10-03 ENCOUNTER — DOCUMENTATION ONLY (OUTPATIENT)
Dept: FAMILY MEDICINE | Facility: CLINIC | Age: 64
End: 2024-10-03
Payer: COMMERCIAL

## 2024-10-03 DIAGNOSIS — R25.3 MUSCLE TWITCHING: Primary | ICD-10-CM

## 2024-10-03 DIAGNOSIS — M62.81 GENERALIZED MUSCLE WEAKNESS: ICD-10-CM

## 2024-10-03 NOTE — PROGRESS NOTES
Discussed with neurology  We will order EMG/MRI cervical spine without contrast/MRI brain without contrast as per the advice of the neurology to elicit further causes of his weakness

## 2024-10-04 ENCOUNTER — OFFICE VISIT (OUTPATIENT)
Dept: NEUROLOGY | Facility: CLINIC | Age: 64
End: 2024-10-04
Attending: INTERNAL MEDICINE
Payer: COMMERCIAL

## 2024-10-04 ENCOUNTER — MYC MEDICAL ADVICE (OUTPATIENT)
Dept: FAMILY MEDICINE | Facility: CLINIC | Age: 64
End: 2024-10-04

## 2024-10-04 DIAGNOSIS — G56.01 CARPAL TUNNEL SYNDROME OF RIGHT WRIST: Primary | ICD-10-CM

## 2024-10-04 DIAGNOSIS — M62.81 GENERALIZED MUSCLE WEAKNESS: ICD-10-CM

## 2024-10-04 DIAGNOSIS — R25.3 MUSCLE TWITCHING: ICD-10-CM

## 2024-10-04 PROCEDURE — 95886 MUSC TEST DONE W/N TEST COMP: CPT | Performed by: PHYSICAL MEDICINE & REHABILITATION

## 2024-10-04 PROCEDURE — 95885 MUSC TST DONE W/NERV TST LIM: CPT | Mod: 59 | Performed by: PHYSICAL MEDICINE & REHABILITATION

## 2024-10-04 PROCEDURE — 95911 NRV CNDJ TEST 9-10 STUDIES: CPT | Performed by: PHYSICAL MEDICINE & REHABILITATION

## 2024-10-04 NOTE — PROGRESS NOTES
[facility]  Electrodiagnostic Laboratory                 Department of Neurology                                                                                                         Test Date:  10/4/2024    Patient: Murphy Ahumada : 1960 Physician: Jazlyn Sadler MD   Sex: Male AGE: 64 year Ref Phys: Timi Dash MD   ID#: [id number]   Technician: Anjel Campos     History and Examination:  Eligio Ahumada is a 63 yo male who presents with generalized weakness and lack of energy in bilateral lower extremities > bilateral upper extremities for the past few weeks. Query polyneuropathy vs myopathy.     Techniques:  Motor conduction studies were done with surface recording electrodes. Sensory conduction studies were performed with surface electrodes, unless indicated otherwise by (n), designating the use of subdermal recording electrodes. Temperature was monitored and recorded throughout the study. Upper extremities were maintained at a temperature of 32 degrees Centigrade or higher.  EMG was done with a concentric needle electrode.     Results:  Evaluation of the right median sensory nerve showed decreased conduction velocity (45 m/s).  The right Median-Ulnar Palmar sensory nerve showed abnormal peak latency difference ((Median Palm-Wrist)-(Ulnar Palm-Wrist), 0.47 ms).  All remaining nerves (as indicated in the following tables) were within normal limits.      All F Wave latencies were within normal limits.      All examined muscles (as indicated in the following table) showed no evidence of electrical instability.        Interpretation:  Abnormal study.     --There is electrodiagnostic evidence of a mild right median neuropathy at the wrist as seen in carpal tunnel syndrome.    --There is no electrodiagnostic evidence of a demyelinating neuropathy or myopathy.    --There is no electrodiagnostic evidence of focal mononeuropathy in the lower extremities.    --There is no  electrodiagnostic evidence of right lower extremity radiculopathy.      ___________________________  Jazlyn Sadler MD        Nerve Conduction Studies  Motor Sites      Latency Neg. Amp Neg. Amp Diff Segment Distance Velocity Neg. Dur Neg Area Diff Temperature Comment   Site (ms) Norm (mV) Norm (%)  cm m/s Norm (ms) (%) ( C)    Right Fibular (EDB) Motor   Ankle 4.6  < 6.0 6.4 -  Ankle-EDB 8   5.7  31.8    Bel Fibular Head 11.6 - 5.4 - -16 Bel Fibular Head-Ankle 30.5 44  > 38 6.4 0 31.9    Pop Fossa 13.9 - 5.0 - -7 Pop Fossa-Bel Fibular Head 11 48  > 38 6.4 -3 32.2    Right Median (APB) Motor   Wrist 4.0  < 4.4 9.9  > 5.0  Wrist-APB 8   4.8  32    Elbow 8.8 - 8.9  > 5.0 -10 Elbow-Wrist 24 50  > 48 5.0 -12 32.3    Right Tibial (AHB) Motor   Ankle 2.8  < 6.5 9.4  > 5.0  Ankle-AH 8   6.5  32.5    Knee 13.7 - 5.6 - -40 Knee-Ankle 43.5 40  > 38 5.1 -42 32.5    Right Ulnar (ADM) Motor   Wrist 3.3  < 3.5 8.8  > 5.0  Wrist-ADM 8   5.7  32.4    Below Elbow 7.3 - 8.0 - -9 Below Elbow-Wrist 22.5 56  > 48 5.8 -1 32.4    Above Elbow 8.6 - 8.0 - 0 Above Elbow-Below Elbow 8.5 65  > 48 5.8 3 32.4      F-Wave Sites      Min F-Lat Max-Min F-Lat Mean F-Lat   Site (ms) (ms) (ms)   Right Tibial F-Wave   Ankle 53.3 7.6 -   Right Ulnar F-Wave   Wrist 29.5 4.0 31.2     Sensory Sites      Onset Lat Peak Lat Amp (O-P) Amp (P-P) Segment Distance Velocity Temperature Comment   Site ms (ms)  V Norm ( V)  cm m/s Norm ( C)    Right Median Sensory   Wrist-Dig II 3.1 4.0 19  > 10 26 Wrist-Dig II 14 45  > 48 32.3    Right Median-Ulnar Palmar Sensory        Median   Palm-Wrist 1.78 2.4 89 - 109 Palm-Wrist 8 45 - 30         Ulnar   Palm-Wrist 1.25 1.93 19 - 44 Palm-Wrist 8 64 - 30.2    Right Superficial Fibular Sensory   Lower Leg-Ankle 3.4 4.1 5  > 3 12 Lower Leg-Ankle 12.5 37 - 32.2    Left Sural Sensory   Calf-Lat Malleolus 3.5 4.2 5  > 5 6 Calf-Lat Malleolus 14 40  > 38 32.1    Right Sural Sensory   Calf-Lat Malleolus 3.4 4.1 6  > 5 7 Calf-Lat  Malleolus 14 41  > 38 31.3    Right Ulnar Sensory   Wrist-Dig V 2.4 3.2 15  > 8 25 Wrist-Dig V 12.5 52  > 48 32.3      Inter-Nerve Comparisons     Nerve 1 Value 1 Nerve 2 Value 2 Parameter Result Normal   Sensory Sites   R Median Palm-Wrist 2.4 ms R Ulnar Palm-Wrist 1.93 ms Peak Lat Diff 0.47 ms <0.40       Electromyography     Side Muscle Ins Act Fibs/PSW Fasc HF Amp Dur Poly Recrt Int Pat   Right Tib ant Nml None Nml 0 Nml Nml 0 Nml Nml   Right Gastroc Nml None Nml 0 Nml Nml 0 Nml Nml   Right Vastus lat Nml None Nml 0 Nml Nml 0 Nml Nml   Left Tib ant Nml None Nml 0 Nml Nml 0 Nml Nml   Left Vastus lat Nml None Nml 0 Nml Nml 0 Nml Nml   Left Gastroc Nml None Nml 0 Nml Nml 0 Nml Nml   Right Iliopsoas Nml None Nml 0 Nml Nml 0 Nml Nml   Right Biceps fem SH Nml None Nml 0 Nml Nml 0 Nml Nml   Right Gluteus med Nml None Nml 0 Nml Nml 0 Nml Nml         NCS Waveforms:    Motor                  Sensory                    F-Wave

## 2024-10-04 NOTE — LETTER
10/4/2024       RE: Eligio Ahumada  [address]  [facility] [address]     Dear Colleague,    Thank you for referring your patient, Eligio Ahumada, to the [facility] [place] at [facility]. Please see a copy of my visit note below.                          [facility]  Electrodiagnostic Laboratory                 Department of Neurology                                                                                                         Test Date:  10/4/2024    Patient: Murphy Ahumada : 1960 Physician: Jazlyn Sadler MD   Sex: Male AGE: 64 year Ref Phys: Timi Dash MD   ID#: [id number]   Technician: Anjel Campos     History and Examination:  Eligio Ahumada is a 63 yo male who presents with generalized weakness and lack of energy in bilateral lower extremities > bilateral upper extremities for the past few weeks. Query polyneuropathy vs myopathy.     Techniques:  Motor conduction studies were done with surface recording electrodes. Sensory conduction studies were performed with surface electrodes, unless indicated otherwise by (n), designating the use of subdermal recording electrodes. Temperature was monitored and recorded throughout the study. Upper extremities were maintained at a temperature of 32 degrees Centigrade or higher.  EMG was done with a concentric needle electrode.     Results:  Evaluation of the right median sensory nerve showed decreased conduction velocity (45 m/s).  The right Median-Ulnar Palmar sensory nerve showed abnormal peak latency difference ((Median Palm-Wrist)-(Ulnar Palm-Wrist), 0.47 ms).  All remaining nerves (as indicated in the following tables) were within normal limits.      All F Wave latencies were within normal limits.      All examined muscles (as indicated in the following table) showed no evidence of electrical instability.        Interpretation:  Abnormal study.      --There is electrodiagnostic evidence of a mild right median neuropathy at the wrist as seen in carpal tunnel syndrome.    --There is no electrodiagnostic evidence of a demyelinating neuropathy or myopathy.    --There is no electrodiagnostic evidence of focal mononeuropathy in the lower extremities.    --There is no electrodiagnostic evidence of right lower extremity radiculopathy.      ___________________________  Jazlyn Sadler MD        Nerve Conduction Studies  Motor Sites      Latency Neg. Amp Neg. Amp Diff Segment Distance Velocity Neg. Dur Neg Area Diff Temperature Comment   Site (ms) Norm (mV) Norm (%)  cm m/s Norm (ms) (%) ( C)    Right Fibular (EDB) Motor   Ankle 4.6  < 6.0 6.4 -  Ankle-EDB 8   5.7  31.8    Bel Fibular Head 11.6 - 5.4 - -16 Bel Fibular Head-Ankle 30.5 44  > 38 6.4 0 31.9    Pop Fossa 13.9 - 5.0 - -7 Pop Fossa-Bel Fibular Head 11 48  > 38 6.4 -3 32.2    Right Median (APB) Motor   Wrist 4.0  < 4.4 9.9  > 5.0  Wrist-APB 8   4.8  32    Elbow 8.8 - 8.9  > 5.0 -10 Elbow-Wrist 24 50  > 48 5.0 -12 32.3    Right Tibial (AHB) Motor   Ankle 2.8  < 6.5 9.4  > 5.0  Ankle-AH 8   6.5  32.5    Knee 13.7 - 5.6 - -40 Knee-Ankle 43.5 40  > 38 5.1 -42 32.5    Right Ulnar (ADM) Motor   Wrist 3.3  < 3.5 8.8  > 5.0  Wrist-ADM 8   5.7  32.4    Below Elbow 7.3 - 8.0 - -9 Below Elbow-Wrist 22.5 56  > 48 5.8 -1 32.4    Above Elbow 8.6 - 8.0 - 0 Above Elbow-Below Elbow 8.5 65  > 48 5.8 3 32.4      F-Wave Sites      Min F-Lat Max-Min F-Lat Mean F-Lat   Site (ms) (ms) (ms)   Right Tibial F-Wave   Ankle 53.3 7.6 -   Right Ulnar F-Wave   Wrist 29.5 4.0 31.2     Sensory Sites      Onset Lat Peak Lat Amp (O-P) Amp (P-P) Segment Distance Velocity Temperature Comment   Site ms (ms)  V Norm ( V)  cm m/s Norm ( C)    Right Median Sensory   Wrist-Dig II 3.1 4.0 19  > 10 26 Wrist-Dig II 14 45  > 48 32.3    Right Median-Ulnar Palmar Sensory        Median   Palm-Wrist 1.78 2.4 89 - 109 Palm-Wrist 8 45 - 30         Ulnar    Palm-Wrist 1.25 1.93 19 - 44 Palm-Wrist 8 64 - 30.2    Right Superficial Fibular Sensory   Lower Leg-Ankle 3.4 4.1 5  > 3 12 Lower Leg-Ankle 12.5 37 - 32.2    Left Sural Sensory   Calf-Lat Malleolus 3.5 4.2 5  > 5 6 Calf-Lat Malleolus 14 40  > 38 32.1    Right Sural Sensory   Calf-Lat Malleolus 3.4 4.1 6  > 5 7 Calf-Lat Malleolus 14 41  > 38 31.3    Right Ulnar Sensory   Wrist-Dig V 2.4 3.2 15  > 8 25 Wrist-Dig V 12.5 52  > 48 32.3      Inter-Nerve Comparisons     Nerve 1 Value 1 Nerve 2 Value 2 Parameter Result Normal   Sensory Sites   R Median Palm-Wrist 2.4 ms R Ulnar Palm-Wrist 1.93 ms Peak Lat Diff 0.47 ms <0.40       Electromyography     Side Muscle Ins Act Fibs/PSW Fasc HF Amp Dur Poly Recrt Int Pat   Right Tib ant Nml None Nml 0 Nml Nml 0 Nml Nml   Right Gastroc Nml None Nml 0 Nml Nml 0 Nml Nml   Right Vastus lat Nml None Nml 0 Nml Nml 0 Nml Nml   Left Tib ant Nml None Nml 0 Nml Nml 0 Nml Nml   Left Vastus lat Nml None Nml 0 Nml Nml 0 Nml Nml   Left Gastroc Nml None Nml 0 Nml Nml 0 Nml Nml   Right Iliopsoas Nml None Nml 0 Nml Nml 0 Nml Nml   Right Biceps fem SH Nml None Nml 0 Nml Nml 0 Nml Nml   Right Gluteus med Nml None Nml 0 Nml Nml 0 Nml Nml         NCS Waveforms:    Motor                  Sensory                    F-Wave                   Again, thank you for allowing me to participate in the care of your patient.      Sincerely,    Jazlyn Sadler MD

## 2024-10-19 ENCOUNTER — HOSPITAL ENCOUNTER (OUTPATIENT)
Dept: MRI IMAGING | Facility: CLINIC | Age: 64
Discharge: HOME | End: 2024-10-19
Attending: INTERNAL MEDICINE | Admitting: INTERNAL MEDICINE
Payer: COMMERCIAL

## 2024-10-19 DIAGNOSIS — M62.81 GENERALIZED MUSCLE WEAKNESS: ICD-10-CM

## 2024-10-19 DIAGNOSIS — R25.3 MUSCLE TWITCHING: ICD-10-CM

## 2024-10-19 PROCEDURE — 72141 MRI NECK SPINE W/O DYE: CPT | Mod: 26 | Performed by: STUDENT IN AN ORGANIZED HEALTH CARE EDUCATION/TRAINING PROGRAM

## 2024-10-19 PROCEDURE — 70551 MRI BRAIN STEM W/O DYE: CPT

## 2024-10-19 PROCEDURE — 72141 MRI NECK SPINE W/O DYE: CPT

## 2024-10-19 PROCEDURE — 70551 MRI BRAIN STEM W/O DYE: CPT | Mod: 26 | Performed by: RADIOLOGY

## 2024-10-21 ENCOUNTER — DOCUMENTATION ONLY (OUTPATIENT)
Dept: FAMILY MEDICINE | Facility: CLINIC | Age: 64
End: 2024-10-21
Payer: COMMERCIAL

## 2024-10-21 DIAGNOSIS — M47.812 SPONDYLOSIS OF CERVICAL REGION WITHOUT MYELOPATHY OR RADICULOPATHY: Primary | ICD-10-CM

## 2024-10-23 DIAGNOSIS — M47.812 CERVICAL SPONDYLOSIS WITHOUT MYELOPATHY: Primary | ICD-10-CM

## 2024-10-23 NOTE — TELEPHONE ENCOUNTER
REASON FOR VISIT: Spondylosis of cervical region without myelopathy or radiculopathy    DATE OF APPT: 10/31/2024   NOTES (FOR ALL VISITS) STATUS DETAILS   OFFICE NOTE from referring provider Internal [facility] Jarrell Jurado MD 10/21/2024   MEDICATION LIST N/A    IMAGING  (FOR ALL VISITS)     XR Scheduled [facility]  XR Cervical spine 10/31/2024  XR Spine complete 10/31/2024   MRI (HEAD, NECK, SPINE) Internal [facility]  MR Cervical spine 10/19/2024  MR Brain 10/19/2024   CT (HEAD, NECK, SPINE) N/A

## 2024-10-31 ENCOUNTER — PRE VISIT (OUTPATIENT)
Dept: NEUROSURGERY | Facility: CLINIC | Age: 64
End: 2024-10-31

## 2024-10-31 ENCOUNTER — ANCILLARY PROCEDURE (OUTPATIENT)
Dept: GENERAL RADIOLOGY | Facility: CLINIC | Age: 64
End: 2024-10-31
Attending: STUDENT IN AN ORGANIZED HEALTH CARE EDUCATION/TRAINING PROGRAM
Payer: COMMERCIAL

## 2024-10-31 ENCOUNTER — OFFICE VISIT (OUTPATIENT)
Dept: NEUROSURGERY | Facility: CLINIC | Age: 64
End: 2024-10-31
Attending: INTERNAL MEDICINE
Payer: COMMERCIAL

## 2024-10-31 VITALS
SYSTOLIC BLOOD PRESSURE: 121 MMHG | BODY MASS INDEX: 29.82 KG/M2 | DIASTOLIC BLOOD PRESSURE: 79 MMHG | HEIGHT: 71 IN | WEIGHT: 213 LBS

## 2024-10-31 DIAGNOSIS — M47.812 CERVICAL SPONDYLOSIS WITHOUT MYELOPATHY: ICD-10-CM

## 2024-10-31 DIAGNOSIS — M47.812 SPONDYLOSIS OF CERVICAL REGION WITHOUT MYELOPATHY OR RADICULOPATHY: ICD-10-CM

## 2024-10-31 DIAGNOSIS — R29.898 LEG WEAKNESS, BILATERAL: Primary | ICD-10-CM

## 2024-10-31 PROCEDURE — 72040 X-RAY EXAM NECK SPINE 2-3 VW: CPT | Performed by: RADIOLOGY

## 2024-10-31 PROCEDURE — 99203 OFFICE O/P NEW LOW 30 MIN: CPT | Performed by: STUDENT IN AN ORGANIZED HEALTH CARE EDUCATION/TRAINING PROGRAM

## 2024-10-31 PROCEDURE — 72082 X-RAY EXAM ENTIRE SPI 2/3 VW: CPT | Performed by: RADIOLOGY

## 2024-10-31 NOTE — NURSING NOTE
"Eligio Ahumada's goals for this visit include:   Chief Complaint   Patient presents with    New Patient     Spondylosis of cervical region without myelopathy or radiculopathy   MR cervical 10/19/202          He requests these members of his care team be copied on today's visit information: yes    PCP: Jarrell Dash    Referring Provider:  Jarrell Dash MD  [address] [place] [address]    /79 (BP Location: Right arm, Patient Position: Sitting)   Ht 1.81 m (5' 11.25\")   Wt 96.6 kg (213 lb)   BMI 29.50 kg/m      Do you need any medication refills at today's visit? No  SAÚL Dean., CMA ([facility])      "

## 2024-10-31 NOTE — LETTER
"10/31/2024      Eligio Ahumada  [address]  [facility] [address]      Dear Colleague,    Thank you for referring your patient, Eligio Ahumada, to the [facility] [place]. Please see a copy of my visit note below.    HPI:  64-year-old male with recurrent episodes of weakness in the lower extremity greater than upper extremity.  These been going on for the last few months and each episode lasts about 30 minutes.  He does recall that he is likely standing when these episodes occur.  He has difficulty moving for about 30 seconds and can force himself but it is painful.  Once this resolves symptoms are left with some cramping and tingling feeling in the lower extremities and has had these in the upper extremities in the past but not as frequent not as severe.  In the interim time he denies any weakness of the upper or lower extremities.  He does have some chronic mild neck pain but treats this with stretching and this generally manages his symptoms.  Current Outpatient Medications   Medication Sig Dispense Refill     tadalafil (CIALIS) 10 MG tablet Take 1 tablet (10 mg) by mouth every 24 hours 10 mg as a single dose =30 minutes prior to anticipated sexual activity; do not take more than once daily. Erectile function may be improved for up to 36 hours following a single dose 30 tablet 0     No current facility-administered medications for this visit.      Physical Exam:  Vital signs:      BP: 121/79             Height: 181 cm (5' 11.25\") Weight: 96.6 kg (213 lb)  Estimated body mass index is 29.5 kg/m  as calculated from the following:    Height as of this encounter: 1.81 m (5' 11.25\").    Weight as of this encounter: 96.6 kg (213 lb).  He has full strength in his bilateral upper and lower extremities.  Sensation is intact to light touch throughout.  No Lindsay sign present.  Biceps and patellar reflexes are 1+ bilaterally.  Gait is normal.  Results Reviewed:  I personally " viewed the images of an MRI of the cervical spine.  This shows multilevel spondylosis with varying levels of foraminal stenosis most significant at C5-6.  No evidence of cord signal change or cord lesions on the MRI.  I also reviewed the images of a brain MRI which shows no significant abnormalities as well.  Assessment:  64-year-old male with episodic weakness in the lower extremities greater in the upper extremities.  His MRI of his cervical spine does not show any evidence for cord compression that would explain lower extremity symptoms.  While he does have foraminal stenosis he does not have symptomatic radiculopathies at this time.  Plan:  I will have him start with physical therapy for his chronic neck pain to see if this will help with his symptoms.  In addition we will get an MRI of the thoracic and lumbar spine to rule out any other central canal lesions that could affect his bilateral lower extremities.  Should there be any abnormalities that could explain the symptoms will have him come back to discuss findings and options going forward but we will also have him referred to neurology as well and cancel that should we find anything on the new MRIs.    Roman Rhodes MD      Again, thank you for allowing me to participate in the care of your patient.        Sincerely,        Roman Rhodes MD

## 2024-10-31 NOTE — PROGRESS NOTES
"HPI:  64-year-old male with recurrent episodes of weakness in the lower extremity greater than upper extremity.  These been going on for the last few months and each episode lasts about 30 minutes.  He does recall that he is likely standing when these episodes occur.  He has difficulty moving for about 30 seconds and can force himself but it is painful.  Once this resolves symptoms are left with some cramping and tingling feeling in the lower extremities and has had these in the upper extremities in the past but not as frequent not as severe.  In the interim time he denies any weakness of the upper or lower extremities.  He does have some chronic mild neck pain but treats this with stretching and this generally manages his symptoms.  Current Outpatient Medications   Medication Sig Dispense Refill    tadalafil (CIALIS) 10 MG tablet Take 1 tablet (10 mg) by mouth every 24 hours 10 mg as a single dose =30 minutes prior to anticipated sexual activity; do not take more than once daily. Erectile function may be improved for up to 36 hours following a single dose 30 tablet 0     No current facility-administered medications for this visit.      Physical Exam:  Vital signs:      BP: 121/79             Height: 181 cm (5' 11.25\") Weight: 96.6 kg (213 lb)  Estimated body mass index is 29.5 kg/m  as calculated from the following:    Height as of this encounter: 1.81 m (5' 11.25\").    Weight as of this encounter: 96.6 kg (213 lb).  He has full strength in his bilateral upper and lower extremities.  Sensation is intact to light touch throughout.  No Lindsay sign present.  Biceps and patellar reflexes are 1+ bilaterally.  Gait is normal.  Results Reviewed:  I personally viewed the images of an MRI of the cervical spine.  This shows multilevel spondylosis with varying levels of foraminal stenosis most significant at C5-6.  No evidence of cord signal change or cord lesions on the MRI.  I also reviewed the images of a brain MRI which " shows no significant abnormalities as well.  Assessment:  64-year-old male with episodic weakness in the lower extremities greater in the upper extremities.  His MRI of his cervical spine does not show any evidence for cord compression that would explain lower extremity symptoms.  While he does have foraminal stenosis he does not have symptomatic radiculopathies at this time.  Plan:  I will have him start with physical therapy for his chronic neck pain to see if this will help with his symptoms.  In addition we will get an MRI of the thoracic and lumbar spine to rule out any other central canal lesions that could affect his bilateral lower extremities.  Should there be any abnormalities that could explain the symptoms will have him come back to discuss findings and options going forward but we will also have him referred to neurology as well and cancel that should we find anything on the new MRIs.    Roman Rhodes MD

## 2024-11-12 ENCOUNTER — TELEPHONE (OUTPATIENT)
Dept: NEUROSURGERY | Facility: CLINIC | Age: 64
End: 2024-11-12
Payer: COMMERCIAL

## 2024-11-12 DIAGNOSIS — R29.898 BILATERAL LEG WEAKNESS: Primary | ICD-10-CM

## 2024-11-12 NOTE — TELEPHONE ENCOUNTER
[facility] Call Center    Phone Message    May a detailed message be left on voicemail: yes     Reason for Call: Other: Pt is calling and stating that he got a phone call stating that his insurance has denied his MRI. Please call Pt back to discuss.      Action Taken: Message routed to:  Other: [facility]    Travel Screening: Not Applicable     Date of Service:

## 2024-11-12 NOTE — TELEPHONE ENCOUNTER
MRI denial recvd. Looks like patient needs to do PT B4 MRI will be covered. I called patient and explained this to him. MRI was cancelled per patient

## 2024-11-12 NOTE — TELEPHONE ENCOUNTER
I returned call to patient and asked him if he knew the reason for the denial. Pt did not but will find out and call back if there is something that we need to do( ie order PT)

## 2024-11-13 NOTE — TELEPHONE ENCOUNTER
Orders for lumbar PT have been signed by the provider. Patient must complete at least 6 weeks of lumbar PT sessions in order for MRI to be approved by his insurance. Writer contacted the  Rehab department to see if it was possible to address both the cervical and lumbar spine during his PT sessions. Unfortunately due to insurance and appointment times this wouldn't be an option. Writer then contacted patient to provide an update. He plans on addressing his lower back first in therapy in order to move forward with an MRI. He understands that he will have to inform the PT department of this change at his first session scheduled on 11/19. Once patient is nearing the end of his 6 weeks of lumbar PT, he will reach back out to the clinic & request for a new MRI to be placed.      NIMCO BeckhamN RN Care Coordinator  Neurology/Neurosurgery/PM&R/Pain Management

## 2024-11-19 ENCOUNTER — THERAPY VISIT (OUTPATIENT)
Dept: PHYSICAL THERAPY | Facility: CLINIC | Age: 64
End: 2024-11-19
Attending: STUDENT IN AN ORGANIZED HEALTH CARE EDUCATION/TRAINING PROGRAM
Payer: COMMERCIAL

## 2024-11-19 DIAGNOSIS — M54.42 CHRONIC BILATERAL LOW BACK PAIN WITH BILATERAL SCIATICA: ICD-10-CM

## 2024-11-19 DIAGNOSIS — G89.29 CHRONIC BILATERAL LOW BACK PAIN WITH BILATERAL SCIATICA: ICD-10-CM

## 2024-11-19 DIAGNOSIS — R29.898 BILATERAL LEG WEAKNESS: Primary | ICD-10-CM

## 2024-11-19 DIAGNOSIS — M54.41 CHRONIC BILATERAL LOW BACK PAIN WITH BILATERAL SCIATICA: ICD-10-CM

## 2024-11-19 PROCEDURE — 97161 PT EVAL LOW COMPLEX 20 MIN: CPT | Mod: GP | Performed by: PHYSICAL THERAPIST

## 2024-11-19 PROCEDURE — 97110 THERAPEUTIC EXERCISES: CPT | Mod: GP | Performed by: PHYSICAL THERAPIST

## 2024-11-19 NOTE — PROGRESS NOTES
PHYSICAL THERAPY EVALUATION  Type of Visit: Evaluation       Fall Risk Screen:  Fall screen completed by: PT  Have you fallen 2 or more times in the past year?: No  Have you fallen and had an injury in the past year?: No  Is patient a fall risk?: No    Subjective         Presenting condition or subjective complaint:  Bilateral leg weakness  Date of onset: 08/19/24    Relevant medical history:   DDD  Dates & types of surgery:  RTC surgery, R knee arthroscopy    Prior diagnostic imaging/testing results:       Prior therapy history for the same diagnosis, illness or injury:        Living Environment  Social support:     Type of home:     Stairs to enter the home:         Ramp:     Stairs inside the home:         Help at home:    Equipment owned:       Employment:    Retired  Hobbies/Interests:  HD [employer] Football    Patient goals for therapy:  less leg weakness    Pain assessment: See objective evaluation for additional pain details     PHYSICAL THERAPY LUMBAR EVALUATION    SUBJECTIVE:  Eligio is a 64 year old male who reports worsening bilateral leg pain with history of low back pain and sciatica. Legs feel sleepy and tingling at times. Hard to move at certain points like his legs suddenly get weak (has happened 4 times). Chronic low back pain with DDD (no recent MRIs).    Patient reports symptoms of:pain, range of motion loss, stiffness or tightness, numbness, tingling, and weakness    Patient report of pain:  Pain Rating Now: 2-3/10  Pain Rating at Best: 2-3/10  Pain Rating at Worst: 7-8/10  Pain Location: lower back, central; bilateral thigh tingling and weakness, bilateral feet  Pain Quality/Description: achy, dull and sharp  Pain Better with: gentle exercise  Pain Worse with: standing and twisting while playing with HD Fantasy Football  Progression of Symptoms: worsened      Patient reports Red Flags symptoms of:  Bilateral Radicular Symptoms    OBJECTIVE:    POSTURE  WNL      PALPATION  Pain with palpation at: QL/ES area and  bilateral    LUMBAR ROM:  Standing Lumbar Flexion (Hands slide down thighs): Fingertips to Toes  Standing Lumbar Extension Assessment: Mild Restriction of 25% of ROM  Right Lumbar Side Bend: No Restriction and WNL; Left Lumbar Side Bend: No Restriction and WNL,   Right Lumbar Rotation: No Restriction and WNL; Left Lumbar Rotation: No Restriction and WNL     Repeated movements: Repeated extension in prone better during, better after.      MYOTOMES:  Hip Flexion (L2): L 5/5, R 5/5  Knee Extension (L3): L 5/5, R 5/5  Ankle Dorsiflexion (L4): L 5/5, R 5/5  Great Toe (L5): L 5/5, R 5/5  Ankle Plantarflexion (S1): L 5/5, R 5/5  Knee Flexion (S2): L 5/5, R 5/5      NEURAL TENSION:   Slump Test: Right Leg: Negative Slump Test, Left Leg: Negative Slump Test  Straight Leg Raise:       JOINT MOBILITY:   DERMATOMES:   OBSERVATION:        IMPRESSION/ASSESSMENT:  Patient is a 64 year old male with low back and bilateral leg complaints.  The following significant findings have been identified: Pain, Decreased ROM/flexibility, Decreased joint mobility, Decreased strength, Impaired balance, Impaired muscle performance, Decreased activity tolerance, and Impaired posture. These impairments interfere with their ability to perform recreational activities, household chores, and driving  as compared to previous level of function.     MEDICAL DIAGNOSIS: Bilateral leg weakness       PT TREATMENT DIAGNOSIS: Bilateral leg weakness, chronic low back pain with bilateral sciatica       Clinical Decision Making (Complexity):  Clinical Presentation: Stable/Uncomplicated  Clinical Presentation Rationale: based on medical and personal factors listed in PT evaluation  Clinical Decision Making (Complexity): Low complexity      PHYSICAL THERAPY PLAN OF CARE:  Treatment Interventions:  Modalities: Mechanical Traction  Interventions: Manual Therapy, Neuromuscular Re-education, Therapeutic Activity, Therapeutic Exercise    Long Term Goals     PT Goal  1  Goal Identifier: LTG 1  Goal Description: Patient will be able to squat and lift 50# without low back pain  Rationale: to maximize safety and independence within the home;to maximize safety and independence within the community  Target Date: 02/11/25    Frequency of Treatment: 2x/week  Duration of Treatment: 2 weeks then 1x/month for 2 months         Risks and benefits of evaluation/treatment have been explained.   Patient/Family/caregiver agrees with Plan of Care.      Evaluation Time:     PT Eval, Low Complexity Minutes (20965): 23     Signing Clinician: Renard Galvez PT      SUMMARY OF PLAN OF CARE:  Patient's primary findings include bilateral leg weakness most likely of low back origin. Physical therapy will focus on leg strengthening and low back ROM and stabilization initially to reduce pain and improve function.          [facility]                                                                                   OUTPATIENT PHYSICAL THERAPY      PLAN OF TREATMENT FOR OUTPATIENT REHABILITATION   Patient's Last Name, First Name, Eligio Romo YOB: 1960   Provider's Name   [facility]   Medical Record No.  [id number]     Onset Date: 08/19/24  Start of Care Date: 11/19/24     Medical Diagnosis:  Bilateral leg weakness      PT Treatment Diagnosis:  Bilateral leg weakness, chronic low back pain with bilateral sciatica Plan of Treatment  Frequency/Duration: 2x/week/ 2 weeks then 1x/month for 2 months    Certification date from 11/19/24 to 02/11/25         See note for plan of treatment details and functional goals     Renard Galvez, PT                         I CERTIFY THE NEED FOR THESE SERVICES FURNISHED UNDER        THIS PLAN OF TREATMENT AND WHILE UNDER MY CARE     (Physician attestation of this document indicates review and certification of the therapy plan).              Referring Provider:  Roman MADSEN  [place]    Initial Assessment  See Epic Evaluation- Start of Care Date: 11/19/24

## 2024-11-20 NOTE — TELEPHONE ENCOUNTER
REASON FOR VISIT: Leg weakness, bilateral [R29.898]    DATE OF APPT: 1/15/2024   NOTES (FOR ALL VISITS) STATUS DETAILS   OFFICE NOTE from referring provider [facility]  Roman Rhodes MD 11/13/2024   MEDICATION LIST N/A    IMAGING  (FOR ALL VISITS)     XR N/A    MRI (HEAD, NECK, SPINE) N/A    CT (HEAD, NECK, SPINE) N/A

## 2024-11-26 ENCOUNTER — THERAPY VISIT (OUTPATIENT)
Dept: PHYSICAL THERAPY | Facility: CLINIC | Age: 64
End: 2024-11-26
Attending: STUDENT IN AN ORGANIZED HEALTH CARE EDUCATION/TRAINING PROGRAM
Payer: COMMERCIAL

## 2024-11-26 DIAGNOSIS — M54.42 CHRONIC BILATERAL LOW BACK PAIN WITH BILATERAL SCIATICA: ICD-10-CM

## 2024-11-26 DIAGNOSIS — M54.41 CHRONIC BILATERAL LOW BACK PAIN WITH BILATERAL SCIATICA: ICD-10-CM

## 2024-11-26 DIAGNOSIS — R29.898 BILATERAL LEG WEAKNESS: Primary | ICD-10-CM

## 2024-11-26 DIAGNOSIS — G89.29 CHRONIC BILATERAL LOW BACK PAIN WITH BILATERAL SCIATICA: ICD-10-CM

## 2024-11-26 PROCEDURE — 97110 THERAPEUTIC EXERCISES: CPT | Mod: GP | Performed by: PHYSICAL THERAPIST

## 2024-12-02 ENCOUNTER — THERAPY VISIT (OUTPATIENT)
Dept: PHYSICAL THERAPY | Facility: CLINIC | Age: 64
End: 2024-12-02
Payer: COMMERCIAL

## 2024-12-02 DIAGNOSIS — R29.898 BILATERAL LEG WEAKNESS: Primary | ICD-10-CM

## 2024-12-02 DIAGNOSIS — M54.42 CHRONIC BILATERAL LOW BACK PAIN WITH BILATERAL SCIATICA: ICD-10-CM

## 2024-12-02 DIAGNOSIS — M54.41 CHRONIC BILATERAL LOW BACK PAIN WITH BILATERAL SCIATICA: ICD-10-CM

## 2024-12-02 DIAGNOSIS — G89.29 CHRONIC BILATERAL LOW BACK PAIN WITH BILATERAL SCIATICA: ICD-10-CM

## 2024-12-02 PROCEDURE — 97110 THERAPEUTIC EXERCISES: CPT | Mod: GP | Performed by: PHYSICAL THERAPIST

## 2024-12-09 ENCOUNTER — THERAPY VISIT (OUTPATIENT)
Dept: PHYSICAL THERAPY | Facility: CLINIC | Age: 64
End: 2024-12-09
Payer: COMMERCIAL

## 2024-12-09 DIAGNOSIS — M54.42 CHRONIC BILATERAL LOW BACK PAIN WITH BILATERAL SCIATICA: ICD-10-CM

## 2024-12-09 DIAGNOSIS — G89.29 CHRONIC BILATERAL LOW BACK PAIN WITH BILATERAL SCIATICA: ICD-10-CM

## 2024-12-09 DIAGNOSIS — M54.41 CHRONIC BILATERAL LOW BACK PAIN WITH BILATERAL SCIATICA: ICD-10-CM

## 2024-12-09 DIAGNOSIS — R29.898 BILATERAL LEG WEAKNESS: Primary | ICD-10-CM

## 2024-12-09 PROCEDURE — 97110 THERAPEUTIC EXERCISES: CPT | Mod: GP | Performed by: PHYSICAL THERAPIST

## 2024-12-16 ENCOUNTER — THERAPY VISIT (OUTPATIENT)
Dept: PHYSICAL THERAPY | Facility: CLINIC | Age: 64
End: 2024-12-16
Payer: COMMERCIAL

## 2024-12-16 DIAGNOSIS — R29.898 BILATERAL LEG WEAKNESS: Primary | ICD-10-CM

## 2024-12-16 DIAGNOSIS — M54.41 CHRONIC BILATERAL LOW BACK PAIN WITH BILATERAL SCIATICA: ICD-10-CM

## 2024-12-16 DIAGNOSIS — G89.29 CHRONIC BILATERAL LOW BACK PAIN WITH BILATERAL SCIATICA: ICD-10-CM

## 2024-12-16 DIAGNOSIS — M54.42 CHRONIC BILATERAL LOW BACK PAIN WITH BILATERAL SCIATICA: ICD-10-CM

## 2024-12-16 PROCEDURE — 97530 THERAPEUTIC ACTIVITIES: CPT | Mod: GP | Performed by: PHYSICAL THERAPIST

## 2024-12-23 ENCOUNTER — THERAPY VISIT (OUTPATIENT)
Dept: PHYSICAL THERAPY | Facility: CLINIC | Age: 64
End: 2024-12-23
Payer: COMMERCIAL

## 2024-12-23 DIAGNOSIS — M54.41 CHRONIC BILATERAL LOW BACK PAIN WITH BILATERAL SCIATICA: ICD-10-CM

## 2024-12-23 DIAGNOSIS — R29.898 BILATERAL LEG WEAKNESS: Primary | ICD-10-CM

## 2024-12-23 DIAGNOSIS — M54.42 CHRONIC BILATERAL LOW BACK PAIN WITH BILATERAL SCIATICA: ICD-10-CM

## 2024-12-23 DIAGNOSIS — G89.29 CHRONIC BILATERAL LOW BACK PAIN WITH BILATERAL SCIATICA: ICD-10-CM

## 2024-12-23 PROCEDURE — 97110 THERAPEUTIC EXERCISES: CPT | Mod: GP | Performed by: PHYSICAL THERAPIST

## 2024-12-23 NOTE — PROGRESS NOTES
Physical Therapy Progress Note       12/23/24 0500   Appointment Info   Signing clinician's name / credentials Renard Galvez, PT, DPT   Total/Authorized Visits 6 per MD   Visits Used 6   Medical Diagnosis Bilateral leg weakness   PT Tx Diagnosis Bilateral leg weakness, chronic low back pain with bilateral sciatica   Other pertinent information Fleeting leg weakness that seems to come on suddenly with certain movements (rotation in standing)   Progress Note/Certification   Start of Care Date 11/19/24   Onset of illness/injury or Date of Surgery 08/19/24   Therapy Frequency 2x/week   Predicted Duration 2 weeks then 1x/month for 2 months   Certification date from 11/19/24   Certification date to 02/11/25   Progress Note Completed Date 11/19/24   PT Goal 1   Goal Identifier LTG 1   Goal Description Patient will be able to squat and lift 50# without low back pain   Rationale to maximize safety and independence within the home;to maximize safety and independence within the community   Goal Progress no episodes this week; can carry about 50 lbs   Target Date 02/11/25   Subjective Report   Subjective Report Feeling good today. Went ice skating yesterday and was quite sore but today feels fine. No episodes of weakness this week. Tolerated body mechanics training last week.   Objective Measures   Objective Measures Objective Measure 4   Objective Measure 1   Objective Measure Strength   Details 5/5 B, continues to work on strength at home and at gym. No notable changes.   Objective Measure 2   Objective Measure Pain level   Details 0/10 currently   Objective Measure 3   Objective Measure Myotomes   Details 5/5 bilaterally   Objective Measure 4   Objective Measure Dermatomes   Details Normal bilaterally   Treatment Interventions (PT)   Interventions Therapeutic Activity   Therapeutic Procedure/Exercise   Therapeutic Procedures: strength, endurance, ROM, flexibility minutes (81864) 30   Ther Proc 1 Recheck myotomes, dermatomes,  ROM   PTRx Ther Proc 1 Prone Press Ups   PTRx Ther Proc 1 - Details x10 NE during or after   PTRx Ther Proc 2 All 4s Cat Cow   PTRx Ther Proc 2 - Details x15   PTRx Ther Proc 3 Double Knee to Chest   PTRx Ther Proc 3 - Details reviewed no questions   PTRx Ther Proc 4 birddog   PTRx Ther Proc 4 - Details x10 with short holds   PTRx Ther Proc 5 Dumbbell Squats   PTRx Ther Proc 5 - Details HEP   PTRx Ther Proc 6 Hip Hinge Bar Slide Down Thighs   PTRx Ther Proc 6 - Details HEP   PTRx Ther Proc 7 Farmer's Walk Single Arm Down with One Kettlebell   PTRx Ther Proc 7 - Details HEP   PTRx Ther Proc 8 Ball Exercise Supine Bridging   PTRx Ther Proc 8 - Details x15   PTRx Ther Proc 9 Side Plank   PTRx Ther Proc 9 - Details 3x20 seconds plinth hip height   PTRx Ther Proc 10 Dead Bug   PTRx Ther Proc 10 - Details 2x10 with exercise ball focusing on neutral spine   Skilled Intervention progressive strengthening and core stability   Patient Response/Progress Tolerated progressions well and feels comfortable completing exercises independently   Education   Learner/Method No Barriers to Learning;Pictures/Video;Demonstration;Reading;Listening;Patient   Plan   Home program PTRX link   Plan for next session will follow up with MD per POC and awaiting MRI   Total Session Time   Timed Code Treatment Minutes 30   Total Treatment Time (sum of timed and untimed services) 30         ASSESSMENT  Eligio Ahumada has completed 6 PT visits with less pain compared to when we started. Patient is independent and demonstrates great technique with his exercises. Symptoms have been well managed recently but further work up indicated given sudden weakness/paresthesia into both legs especially with lifting grandchildren.    PLAN  Return to MD for further work up, continue HEP    Beginning/End Dates of Progress Note Reporting Period:  11/19/24 to 12/23/2024    Referring Provider:  Roman Rhodes

## 2025-01-15 ENCOUNTER — PRE VISIT (OUTPATIENT)
Dept: NEUROLOGY | Facility: CLINIC | Age: 65
End: 2025-01-15

## 2025-03-11 ENCOUNTER — OFFICE VISIT (OUTPATIENT)
Dept: UROLOGY | Facility: CLINIC | Age: 65
End: 2025-03-11
Payer: COMMERCIAL

## 2025-03-11 VITALS
HEART RATE: 89 BPM | DIASTOLIC BLOOD PRESSURE: 79 MMHG | SYSTOLIC BLOOD PRESSURE: 119 MMHG | OXYGEN SATURATION: 98 % | BODY MASS INDEX: 30.19 KG/M2 | WEIGHT: 218 LBS

## 2025-03-11 DIAGNOSIS — N40.1 BENIGN PROSTATIC HYPERPLASIA WITH LOWER URINARY TRACT SYMPTOMS, SYMPTOM DETAILS UNSPECIFIED: Primary | ICD-10-CM

## 2025-03-11 PROCEDURE — 3074F SYST BP LT 130 MM HG: CPT | Performed by: UROLOGY

## 2025-03-11 PROCEDURE — 51798 US URINE CAPACITY MEASURE: CPT | Performed by: UROLOGY

## 2025-03-11 PROCEDURE — 3078F DIAST BP <80 MM HG: CPT | Performed by: UROLOGY

## 2025-03-11 PROCEDURE — 99204 OFFICE O/P NEW MOD 45 MIN: CPT | Performed by: UROLOGY

## 2025-03-11 RX ORDER — ALFUZOSIN HYDROCHLORIDE 10 MG/1
10 TABLET, EXTENDED RELEASE ORAL AT BEDTIME
Qty: 90 TABLET | Refills: 3 | Status: SHIPPED | OUTPATIENT
Start: 2025-03-11

## 2025-03-11 NOTE — PROGRESS NOTES
Slow stream, some post void dribbling    2-3 COFFEE  3-4 WATER  1 JUICE  Bladder scan performed 4ml detected in bladder. Marcella Pollack, CMA

## 2025-03-11 NOTE — PROGRESS NOTES
S: Eligio Ahumada is a pleasant  64 year old male who was requested to be seen by  Jarrell Dash for a consult with regard to patient's urinary complaints.  Patient complains of Hesitancy in starting urinary stream, Nocturia x 2, Frequency, and weak stream.  He has no history of elevated PSA.  Symptoms have been on going for   several years(s).  Seems to be worsened over time.  His recent PSA was found to be   Prostate Specific Antigen Screen   Date Value Ref Range Status   08/12/2024 1.15 0.00 - 4.50 ng/mL Final   .  His AUA Symptom Score:  25.  His QOL score:  5.    Current Outpatient Medications   Medication Sig Dispense Refill    alfuzosin ER (UROXATRAL) 10 MG 24 hr tablet Take 1 tablet (10 mg) by mouth at bedtime. 90 tablet 3    tadalafil (CIALIS) 10 MG tablet Take 1 tablet (10 mg) by mouth every 24 hours 10 mg as a single dose =30 minutes prior to anticipated sexual activity; do not take more than once daily. Erectile function may be improved for up to 36 hours following a single dose 30 tablet 0     No Known Allergies  Past Medical History:   Diagnosis Date    BCC (basal cell carcinoma), face     nose, MOHS, other locations     Past Surgical History:   Procedure Laterality Date    ARTHROSCOPY KNEE Right     x 2    left rotator cuff surgery      SHOULDER SURGERY Right     bone spur      Family History   Problem Relation Age of Onset    Rheumatoid Arthritis Mother     Macular Degeneration Mother     Ovarian Cancer Mother     Osteoporosis Mother     Hyperlipidemia Mother     Diabetes Father     Heart Failure Father     Hyperlipidemia Father     Rheumatoid Arthritis Brother     Prostate Cancer No family hx of      He does not have a family history of prostate cancer.  Social History     Socioeconomic History    Marital status:      Spouse name: None    Number of children: None    Years of education: None    Highest education level: None   Tobacco Use    Smoking status: Never    Smokeless tobacco: Never    Vaping Use    Vaping status: Never Used   Substance and Sexual Activity    Alcohol use: Yes    Drug use: Never    Sexual activity: Yes     Partners: Female     Birth control/protection: None   Other Topics Concern    Parent/sibling w/ CABG, MI or angioplasty before 65F 55M? No     Social Drivers of Health     Financial Resource Strain: Low Risk  (8/10/2024)    Financial Resource Strain     Within the past 12 months, have you or your family members you live with been unable to get utilities (heat, electricity) when it was really needed?: No   Food Insecurity: Low Risk  (8/10/2024)    Food Insecurity     Within the past 12 months, did you worry that your food would run out before you got money to buy more?: No     Within the past 12 months, did the food you bought just not last and you didn t have money to get more?: No   Transportation Needs: Low Risk  (8/10/2024)    Transportation Needs     Within the past 12 months, has lack of transportation kept you from medical appointments, getting your medicines, non-medical meetings or appointments, work, or from getting things that you need?: No   Physical Activity: Sufficiently Active (8/10/2024)    Exercise Vital Sign     Days of Exercise per Week: 4 days     Minutes of Exercise per Session: 50 min   Stress: No Stress Concern Present (8/10/2024)    [employer] - Occupational Stress Questionnaire     Feeling of Stress : Not at all   Social Connections: Unknown (8/10/2024)    Social Connection and Isolation Panel [NHANES]     Frequency of Social Gatherings with Friends and Family: Three times a week   Interpersonal Safety: Low Risk  (8/12/2024)    Interpersonal Safety     Do you feel physically and emotionally safe where you currently live?: Yes     Within the past 12 months, have you been hit, slapped, kicked or otherwise physically hurt by someone?: No     Within the past 12 months, have you been humiliated or emotionally abused in other ways  by your partner or ex-partner?: No   Housing Stability: Low Risk  (8/10/2024)    Housing Stability     Do you have housing? : Yes     Are you worried about losing your housing?: No        REVIEW OF SYSTEMS  =================  C: NEGATIVE for fever, chills, change in weight  I: NEGATIVE for worrisome rashes, moles or lesions  E/M: NEGATIVE for ear, mouth and throat problems  R: NEGATIVE for significant cough or SHORTNESS OF BREATH  CV:  NEGATIVE for chest pain, palpitations or peripheral edema  GI: NEGATIVE for nausea, abdominal pain, heartburn, or change in bowel habits  NEURO: NEGATIVE numbness/weakness  : see HPI  PSYCH: NEGATIVE depression/anxiety  LYmph: no new enlarged lymph nodes  Ortho: no new trauma/movements           O: Exam:/79   Pulse 89   Wt 98.9 kg (218 lb)   SpO2 98%   BMI 30.19 kg/m     Constitutional: healthy, alert and no distress  Cardiovascular: negative, PMI normal.   Respiratory: negative, no evidence of respiratory distress  Gastrointestinal: Abdomen soft, non-tender. BS normal. No masses, organomegaly  : normal penis.  Prostate 30-40 gm smooth.  No bladder distension.  PVR 4 ml  Musculoskeletal: extremities normal- no gross deformities noted, gait normal and normal muscle tone  Skin: no suspicious lesions or rashes  Neurologic: Alert and oriented  Psychiatric: mentation appears normal. and affect normal/bright  Hematologic/Lymphatic/Immunologic: normal ant/post cervical, axillary, supraclavicular and inguinal nodes    Assessment/Plan:   (N40.1) Benign prostatic hyperplasia with lower urinary tract symptoms, symptom details unspecified  (primary encounter diagnosis)  Comment:    Plan: MEASURE POST-VOID RESIDUAL URINE/BLADDER         CAPACITY, US NON-IMAGING (21148), alfuzosin ER         (UROXATRAL) 10 MG 24 hr tablet               Recommendations:   Medical management versus surgical management versus office treatments, were discussed with patient at length. Pros and Cons  discussed. Trial of alfuzosin. Side effects discussed.

## 2025-03-19 ENCOUNTER — TELEPHONE (OUTPATIENT)
Dept: FAMILY MEDICINE | Facility: CLINIC | Age: 65
End: 2025-03-19
Payer: COMMERCIAL

## 2025-03-19 NOTE — TELEPHONE ENCOUNTER
Reason for Call:  Appointment Request    Patient requesting this type of appt:  same day appointment    Requested provider: Jarrell Dash or anyone provider at the clinic    Reason patient unable to be scheduled: Not within requested timeframe    When does patient want to be seen/preferred time: Same day     Comments: Reviewed available appoinments with Murphy, he is agreeable to appointment on 3/20/25- appointment scheduled.        Call taken on 3/19/2025 at 10:01 AM by Brittnee Lopez RN

## 2025-03-20 ENCOUNTER — OFFICE VISIT (OUTPATIENT)
Dept: FAMILY MEDICINE | Facility: CLINIC | Age: 65
End: 2025-03-20
Payer: COMMERCIAL

## 2025-03-20 ENCOUNTER — ANCILLARY PROCEDURE (OUTPATIENT)
Dept: GENERAL RADIOLOGY | Facility: CLINIC | Age: 65
End: 2025-03-20
Attending: INTERNAL MEDICINE
Payer: COMMERCIAL

## 2025-03-20 VITALS
RESPIRATION RATE: 17 BRPM | DIASTOLIC BLOOD PRESSURE: 73 MMHG | SYSTOLIC BLOOD PRESSURE: 109 MMHG | BODY MASS INDEX: 30.62 KG/M2 | WEIGHT: 218.7 LBS | HEART RATE: 77 BPM | HEIGHT: 71 IN | TEMPERATURE: 97.8 F | OXYGEN SATURATION: 98 %

## 2025-03-20 DIAGNOSIS — R09.89 PULMONARY CONGESTION: ICD-10-CM

## 2025-03-20 DIAGNOSIS — R05.3 PERSISTENT COUGH: ICD-10-CM

## 2025-03-20 DIAGNOSIS — R93.1 ELEVATED CORONARY ARTERY CALCIUM SCORE: ICD-10-CM

## 2025-03-20 DIAGNOSIS — R05.3 PERSISTENT COUGH: Primary | ICD-10-CM

## 2025-03-20 DIAGNOSIS — J30.1 SEASONAL ALLERGIC RHINITIS DUE TO POLLEN: ICD-10-CM

## 2025-03-20 DIAGNOSIS — K21.9 GASTROESOPHAGEAL REFLUX DISEASE WITHOUT ESOPHAGITIS: ICD-10-CM

## 2025-03-20 RX ORDER — OMEPRAZOLE 20 MG/1
20 CAPSULE, DELAYED RELEASE ORAL DAILY
Qty: 90 CAPSULE | Refills: 0 | Status: SHIPPED | OUTPATIENT
Start: 2025-03-20

## 2025-03-20 RX ORDER — ALBUTEROL SULFATE 90 UG/1
2 INHALANT RESPIRATORY (INHALATION) EVERY 6 HOURS PRN
Qty: 18 G | Refills: 1 | Status: SHIPPED | OUTPATIENT
Start: 2025-03-20

## 2025-03-20 RX ORDER — PREDNISONE 20 MG/1
20 TABLET ORAL DAILY
Qty: 10 TABLET | Refills: 0 | Status: SHIPPED | OUTPATIENT
Start: 2025-03-20 | End: 2025-03-30

## 2025-03-20 ASSESSMENT — ENCOUNTER SYMPTOMS: COUGH: 1

## 2025-03-20 NOTE — PROGRESS NOTES
"  Assessment & Plan     1.  Persistent cough x 2 months following URI/bronchitis.  Chest x-ray clear today.  Reviewed with patient.  Suspect this is postviral cough syndrome with some reactive airway problem.  There may also be some element of reflux related cough.  Recommend a short trial of prednisone 20 mg daily for 10 days and albuterol 2 puffs every 4 hours as needed also omeprazole 20 mg daily for at least 30 days.  2.  Pulmonary congestion.  Clinically lungs are clear and chest x-ray is clear.  No evidence of lung congestion or pneumonitis.  3.  Gastroesophageal reflux based on history.  Recommend trying omeprazole 20 mg daily.  4.  Seasonal allergic rhinitis due to pollen.  5.  Elevated coronary arteries calcium score.  Patient indicates he has difficulty tolerating statins.    BMI  Estimated body mass index is 30.5 kg/m  as calculated from the following:    Height as of this encounter: 1.803 m (5' 11\").    Weight as of this encounter: 99.2 kg (218 lb 11.2 oz).         Kaylan Arrington is a 64 year old, presenting for the following health issues:  Cough and Nasal Congestion        3/20/2025     9:22 AM   Additional Questions   Roomed by Ary     Cough    History of Present Illness       Reason for visit:  On going Chest Congestion  Symptom onset:  More than a month  Symptoms include:  Chest congestion, cough, wheezing  Symptom intensity:  Moderate  Symptom progression:  Staying the same  Had these symptoms before:  No   He is taking medications regularly.          Acute Illness  Acute illness concerns: cough and congestion   Onset/Duration: more than a month   Symptoms:  Fever: No  Chills/Sweats: No  Headache (location?): No  Sinus Pressure: YES  Conjunctivitis:  No  Ear Pain: no  Rhinorrhea: No  Congestion: YES  Sore Throat: No  Cough: YES  Wheeze: YES  Decreased Appetite: No  Nausea: No  Vomiting: No  Diarrhea: No  Dysuria/Freq.: No  Dysuria or Hematuria: No  Fatigue/Achiness: No  Sick/Strep Exposure: " "No  Therapies tried and outcome: none    64-year-old gentleman presents with lung congestion and persistent cough for 2 months.  2 months ago he had a sore throat and upper airway infection symptoms.  Then it settled into his chest.  He he had cough.  No fever chills or shortness of breath.  No history of asthma but does have seasonal allergic rhinitis due to pollen.  Non-smoker.    The symptoms got better after the initial infection but then the cough and feeling of congestion in the chest has persisted.  His wife indicates that he wheezes at night.    Review of Systems  Constitutional, HEENT, cardiovascular, pulmonary, GI, , musculoskeletal, neuro, skin, endocrine and psych systems are negative, except as otherwise noted.      Objective    /73 (BP Location: Right arm, Patient Position: Sitting, Cuff Size: Adult Large)   Pulse 77   Temp 97.8  F (36.6  C) (Oral)   Resp 17   Ht 1.803 m (5' 11\")   Wt 99.2 kg (218 lb 11.2 oz)   SpO2 98%   BMI 30.50 kg/m    Body mass index is 30.5 kg/m .  Physical Exam   GENERAL: alert and no distress  HENT: ear canals and TM's normal, nose and mouth without ulcers or lesions  NECK: no adenopathy, no asymmetry, masses, or scars  RESP: lungs clear to auscultation - no rales, rhonchi or wheezes  CV: regular rate and rhythm, normal S1 S2, no S3 or S4, no murmur, click or rub, no peripheral edema    Chest x-ray performed today reviewed with patient.  Looks clear to me.        Signed Electronically by: Tarun West MD    "

## 2025-03-22 DIAGNOSIS — R05.3 PERSISTENT COUGH: Primary | ICD-10-CM

## 2025-03-22 RX ORDER — DOXYCYCLINE 100 MG/1
100 CAPSULE ORAL 2 TIMES DAILY
Qty: 10 CAPSULE | Refills: 0 | Status: SHIPPED | OUTPATIENT
Start: 2025-03-22 | End: 2025-03-27

## 2025-03-24 ENCOUNTER — MYC MEDICAL ADVICE (OUTPATIENT)
Dept: FAMILY MEDICINE | Facility: CLINIC | Age: 65
End: 2025-03-24
Payer: COMMERCIAL

## 2025-07-14 ENCOUNTER — PATIENT OUTREACH (OUTPATIENT)
Dept: CARE COORDINATION | Facility: CLINIC | Age: 65
End: 2025-07-14
Payer: COMMERCIAL

## 2025-07-28 ENCOUNTER — PATIENT OUTREACH (OUTPATIENT)
Dept: CARE COORDINATION | Facility: CLINIC | Age: 65
End: 2025-07-28
Payer: COMMERCIAL

## 2025-08-17 SDOH — HEALTH STABILITY: PHYSICAL HEALTH: ON AVERAGE, HOW MANY MINUTES DO YOU ENGAGE IN EXERCISE AT THIS LEVEL?: 30 MIN

## 2025-08-17 SDOH — HEALTH STABILITY: PHYSICAL HEALTH: ON AVERAGE, HOW MANY DAYS PER WEEK DO YOU ENGAGE IN MODERATE TO STRENUOUS EXERCISE (LIKE A BRISK WALK)?: 2 DAYS

## 2025-08-17 ASSESSMENT — SOCIAL DETERMINANTS OF HEALTH (SDOH): HOW OFTEN DO YOU GET TOGETHER WITH FRIENDS OR RELATIVES?: MORE THAN THREE TIMES A WEEK

## 2025-08-20 ENCOUNTER — OFFICE VISIT (OUTPATIENT)
Dept: FAMILY MEDICINE | Facility: CLINIC | Age: 65
End: 2025-08-20
Payer: COMMERCIAL

## 2025-08-20 VITALS
OXYGEN SATURATION: 96 % | SYSTOLIC BLOOD PRESSURE: 114 MMHG | WEIGHT: 210.9 LBS | BODY MASS INDEX: 29.52 KG/M2 | HEART RATE: 56 BPM | HEIGHT: 71 IN | RESPIRATION RATE: 16 BRPM | DIASTOLIC BLOOD PRESSURE: 72 MMHG | TEMPERATURE: 97.5 F

## 2025-08-20 DIAGNOSIS — Z12.5 SCREENING FOR PROSTATE CANCER: ICD-10-CM

## 2025-08-20 DIAGNOSIS — Z13.220 SCREENING FOR HYPERLIPIDEMIA: ICD-10-CM

## 2025-08-20 DIAGNOSIS — Z00.00 ENCOUNTER FOR MEDICARE ANNUAL WELLNESS EXAM: Primary | ICD-10-CM

## 2025-08-20 LAB
ALBUMIN SERPL BCG-MCNC: 4.3 G/DL (ref 3.5–5.2)
ALP SERPL-CCNC: 55 U/L (ref 40–150)
ALT SERPL W P-5'-P-CCNC: 18 U/L (ref 0–70)
ANION GAP SERPL CALCULATED.3IONS-SCNC: 11 MMOL/L (ref 7–15)
AST SERPL W P-5'-P-CCNC: 27 U/L (ref 0–45)
BILIRUB SERPL-MCNC: 0.8 MG/DL
BUN SERPL-MCNC: 15.9 MG/DL (ref 8–23)
CALCIUM SERPL-MCNC: 9.2 MG/DL (ref 8.8–10.4)
CHLORIDE SERPL-SCNC: 104 MMOL/L (ref 98–107)
CHOLEST SERPL-MCNC: 169 MG/DL
CREAT SERPL-MCNC: 0.82 MG/DL (ref 0.67–1.17)
EGFRCR SERPLBLD CKD-EPI 2021: >90 ML/MIN/1.73M2
FASTING STATUS PATIENT QL REPORTED: YES
FASTING STATUS PATIENT QL REPORTED: YES
GLUCOSE SERPL-MCNC: 107 MG/DL (ref 70–99)
HCO3 SERPL-SCNC: 23 MMOL/L (ref 22–29)
HDLC SERPL-MCNC: 44 MG/DL
LDLC SERPL CALC-MCNC: 108 MG/DL
NONHDLC SERPL-MCNC: 125 MG/DL
POTASSIUM SERPL-SCNC: 4.3 MMOL/L (ref 3.4–5.3)
PROT SERPL-MCNC: 7.1 G/DL (ref 6.4–8.3)
PSA SERPL DL<=0.01 NG/ML-MCNC: 0.73 NG/ML (ref 0–4.5)
SODIUM SERPL-SCNC: 138 MMOL/L (ref 135–145)
TRIGL SERPL-MCNC: 85 MG/DL

## 2025-08-20 ASSESSMENT — PAIN SCALES - GENERAL: PAINLEVEL_OUTOF10: MODERATE PAIN (5)
